# Patient Record
Sex: FEMALE | Race: BLACK OR AFRICAN AMERICAN | Employment: FULL TIME | URBAN - METROPOLITAN AREA
[De-identification: names, ages, dates, MRNs, and addresses within clinical notes are randomized per-mention and may not be internally consistent; named-entity substitution may affect disease eponyms.]

---

## 2017-02-09 ENCOUNTER — HOSPITAL ENCOUNTER (OUTPATIENT)
Dept: PHYSICAL THERAPY | Age: 52
Discharge: HOME | End: 2017-02-09
Payer: COMMERCIAL

## 2017-02-09 PROCEDURE — 97014 ELECTRIC STIMULATION THERAPY: CPT

## 2017-02-09 PROCEDURE — 97110 THERAPEUTIC EXERCISES: CPT

## 2017-02-09 PROCEDURE — 97161 PT EVAL LOW COMPLEX 20 MIN: CPT

## 2017-02-09 NOTE — PROGRESS NOTES
Ambulatory/Rehab Services H2 Model Falls Risk Assessment    Risk Factor Pts. ·   Confusion/Disorientation/Impulsivity  []    4 ·   Symptomatic Depression  []   2 ·   Altered Elimination  []   1 ·   Dizziness/Vertigo  []   1 ·   Gender (Male)  []   1 ·   Any administered antiepileptics (anticonvulsants):  []   2 ·   Any administered benzodiazepines:  []   1 ·   Visual Impairment (specify):  []   1 ·   Portable Oxygen Use  []   1 ·   Orthostatic ? BP  []   1 ·   History of Recent Falls (within 3 mos.)  []   5     Ability to Rise from Chair (choose one) Pts. ·   Ability to rise in a single movement  []   0 ·   Pushes up, successful in one attempt  [x]   1 ·   Multiple attempts, but successful  []   3 ·   Unable to rise without assistance  []   4   Total: (5 or greater = High Risk) 1     Falls Prevention Plan:   []                Physical Limitations to Exercise (specify):   []                Mobility Assistance Device (type):   []                Exercise/Equipment Adaptation (specify):    ©2010 [employer] of [address] Patent #[id number].  Federal Law prohibits the replication, distribution or use without written permission from [employer]

## 2017-02-09 NOTE — PROGRESS NOTES
Jude Katie  : 1965 [facility] [address], [place], [address] [place] [address] Street  [phone]   [phone]        OUTPATIENT PHYSICAL THERAPY:Initial Assessment 2017    ICD-10: Treatment Diagnosis: low back pain (M54.5)  Precautions/Allergies:   Review of patient's allergies indicates no known allergies. Fall Risk Score: 1 (? 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 Cruz Hernandez is a 46 y.o. female presenting to physical therapy with complaints of low back pain which began 16 after transferring a nursing home patient from the bed to a wheelchair. Patient was seen in our clinic at the end of  and is returning with referral from her pain management doctors. She reports feeling much better than she did before, but having constant low back pain. She states her pain is 5/10 with minimal to no change based on positioning or activity level. Patient does have some limitations with dressing, bathing, driving, lifting, carrying, vacuuming, sitting in the car, walking long distances, and sleeping through the night without waking due to low back pain. Patient is out of work (CNA) but would like to be able to return once her pain is controlled and she can perform the duties required of her. Patient presents with increased pain, decreased strength, decreased ROM, decreased flexibility,impaired transfer ability, decreased activity tolerance, and overall impaired functional mobility.  Patient is a good candidate for skilled physical therapy interventions to include manual therapy, therapeutic exercise, balance training, gait training, transfer training, postural re-education, body mechanics training, and pain modalities as needed. PROBLEM LIST (Impacting functional limitations):  1. Decreased Strength  2. Decreased ADL/Functional Activities  3. Decreased Transfer Abilities  4. Decreased Ambulation Ability/Technique  5. Increased Pain  6. Decreased Activity Tolerance  7. Decreased Pacing Skills  8. Decreased Work Simplification/Energy Conservation Techniques  9. Decreased Flexibility/Joint Mobility INTERVENTIONS PLANNED:  1. Balance Exercise  2. Cold  3. Electrical Stimulation  4. Gait Training  5. Heat  6. Home Exercise Program (HEP)  7. Manual Therapy  8. Neuromuscular Re-education/Strengthening  9. Range of Motion (ROM)  10. Therapeutic Activites  11. Therapeutic Exercise/Strengthening  12. Transfer Training  13. Ultrasound (US)   TREATMENT PLAN:  Effective Dates: 2/9/17 to 3/27/17. Frequency/Duration: 2 times a week for 6 weeks (12 authorized sessions)  GOALS: (Goals have been discussed and agreed upon with patient.)  Short-Term Functional Goals: Time Frame: 2/9/17 to 3/3/17  1. Patient will be independent with HEP to improve core stability, LE flexibility, and overall functional mobility. 2. Patient will report no more than 4/10 low back pain at rest in order to demonstrate improved self pain control and tolerance. 3. Patient will be educated in and demonstrate proper squat lift technique in order to improve safety with lifting activities and reduce risk of future injuries. Discharge Goals: Time Frame: 2/9/17 to 3/27/17  1. Patient will improve gross core strength to 4/5 in order to improve lumbar stability for work related tasks. 2. Patient will be able to stand or walk with minimal rest breaks for 3 hours with minimal to no increase in low back pain in order to demonstrate improved activity tolerance.   3. Patient will be able to sleep through the night without waking due to low back pain in order to return to prior sleeping pattern for overall health and wellness. 4. Patient will be able to do housework, including vacuuming, with no increased low back pain in order to demonstrate return to prior level of function. 5. Patient will be able to lift and transfer 20 lbs with good form and no complaints of low back pain in order to improve safety with return to work activities. 6. Patient will improve Modified Oswestry Scale score to 10/50 from 18/50. Rehabilitation Potential For Stated Goals: Good  Regarding Noemy Richard's therapy, I certify that the treatment plan above will be carried out by a therapist or under their direction. Thank you for this referral,  Catia Hadley PT     Referring Physician Signature: Alyssa Gregg MD              Date                    The information in this section was collected on 2/9/17 (except where otherwise noted). HISTORY:   History of Present Injury/Illness (Reason for Referral):  Ms. Pate is a 46 y.o. female presenting to physical therapy with complaints of low back pain which began 8/16/16 after transferring a nursing home patient from the bed to a wheelchair. Patient was seen in our clinic at the end of 2016 and is returning with referral from her pain management doctors. She reports feeling much better than she did before, but having constant low back pain. She states her pain is 5/10 with minimal to no change based on positioning or activity level. Patient does have some limitations with dressing, bathing, driving, lifting, carrying, vacuuming, sitting in the car, walking long distances, and sleeping through the night without waking due to low back pain. Patient is out of work (CNA) but would like to be able to return once her pain is controlled and she can perform the duties required of her.  Patient presents with increased pain, decreased strength, decreased ROM, decreased flexibility,impaired transfer ability, decreased activity tolerance, and overall impaired functional mobility. Past Medical History/Comorbidities:   Ms. Elif Bustos  has no past medical history on file. Ms. Elif Bustos  has no past surgical history on file. She reports lumbar spine MRI indicating arthritis and disc bulge. Social History/Living Environment:     Not currently working. Patient is a CNA at [employer]. Prior Level of Function/Work/Activity:  Patient is independent with ADLs, self care, transfers, and ambulation, but reports needing additional time or modifications due to low back pain. Dominant Side:         RIGHT  Personal Factors:          Sex:  female        Age:  46 y.o. Current Medications:       Current Outpatient Prescriptions:     cyclobenzaprine (FLEXERIL) 5 mg tablet, Take 2 Tabs by mouth three (3) times daily as needed for Muscle Spasm(s). , Disp: 30 Tab, Rfl: 0    lidocaine HCl (ASPERCREME, LIDOCAINE,) 4 % crea, 1 Dose by Apply Externally route three (3) times daily as needed. , Disp: 1 Tube, Rfl: 0   Date Last Reviewed:  2/9/2017   Number of Personal Factors/Comorbidities that affect the Plan of Care:  (Given chronicity of low back pain) 1-2: MODERATE COMPLEXITY   EXAMINATION:   Patient denies any LE paresthesia. Patient denies any increase of symptoms with cough, sneeze or valsalva. Patient denies any saddle paresthesia or bowel/bladder deficits. Observation/Orthostatic Postural Assessment:          In standing: minimally increased lumbar lordosis, increased skin fold noted above L ilium, L iliac crest higher than R, mild trendelenburg during stance on L LE. Palpation:          Moderate tenderness to palpation of L gluteal, piriformis, and hip musculature with moderate tightness noted. Minimal tenderness R gluteal and piriformis with moderate tightness noted. Minimal tenderness over lumbar spine and paraspinals.     ROM:  Patient reported lumbar extension more uncomfortable than flexion  AROM (degrees)   Lumbar Flexion 40   Lumbar Extension 5   Moderate tightness noted in L hamstring muscle. Moderate tightness noted in bilateral piriformis muscles. Strength:  Mildly increased low back pain reported with resisted L hip flexion  Motion Tested Left   (*/5) Right  (*/5)   Hip Flexion 4 5   Hip Abduction 4 4+   Knee Extension 5 4   Ankle Dorsiflexion 5 4   Gross core strength 3/5 as observed with transfers and activation of transverse abdominus. Special Tests:          Neural tension tests: Passive straight leg raise (SLR) test is negative. Crossed SLR test is negative. Slump test is negative. SRINIVAS: positive for pain on L        Thigh thrust: negative bilaterally        Single leg long axis distraction: negative for change in low back pain        Lumbar traction: negative for change in low back pain        Bilateral knee valgus with sit to stand transfer. Trendelenburg: positive on L     Passive Accessory Motion: None assessed today. Will continue to assess as needed. Neurological Screen:              Myotomes: Key muscle strength testing through bilateral LE is WNL. Dermatomes: Sensation to light touch for bilateral LE is intact from L2 to S2, some diminished sensation reported at L1 on L. Reflexes: Patellar (L3/ L4): 2+ bilaterally (L LE required multiple attempts)                Achilles (S1/ S2): 2+ bilaterally   Functional Mobility:         Gait/Ambulation:  Mild antalgic pattern, mild trendelenburg on L, minimal bilateral genu valgus, mildly slow tamiko. Transfers:  Minimal difficulty with sit to stand transfers with minimal to moderate use of bilateral UE. Balance:          Sitting and standing balance grossly intact. Body Structures Involved:  1. Bones  2. Joints  3. Muscles Body Functions Affected:  1. Sensory/Pain  2. Neuromusculoskeletal  3. Movement Related Activities and Participation Affected:  1. Mobility  2.  Community, Social and [place]   Number of elements (examined above) that affect the Plan of Care:  (Chronicity of pain may affect plan of care, but patient with good progress with prior therapy.) 1-2: LOW COMPLEXITY   CLINICAL PRESENTATION:   Presentation: Evolving clinical presentation with changing clinical characteristics: MODERATE COMPLEXITY   CLINICAL DECISION MAKING:   Outcome Measure: Tool Used: Modified Oswestry Low Back Pain Questionnaire  Score:  Initial: 18/50  Most Recent: X/50 (Date: -- )   Interpretation of Score: Each section is scored on a 0-5 scale, 5 representing the greatest disability. The scores of each section are added together for a total score of 50. Score 0 1-10 11-20 21-30 31-40 41-49 50   Modifier CH CI CJ CK CL CM CN     Medical Necessity:   · Patient is expected to demonstrate progress in strength, range of motion, balance, coordination and functional technique to increase independence with ambulation, transfers, and work related tasks and improve safety during lifting, carrying, tranfers, ambulation, travel, and returning to work duties. · Skilled intervention continues to be required due to increased low back pain hindering return to prior functional level including work. Reason for Services/Other Comments:  · Patient continues to require skilled intervention due to increased low back pain hindering overall activity tolerance, daily tasks, and return to prior funcitonal level including work/ job duties. Use of outcome tool(s) and clinical judgement create a POC that gives a:  (Due to chronicity of pain and reports of constant pain at this time.  Patient has not yet returned to functional tasks which will be required for work which leaves patients predicted progress questionable.) Questionable prediction of patient's progress: MODERATE COMPLEXITY            TREATMENT:   (In addition to Assessment/Re-Assessment sessions the following treatments were rendered)  Pre-treatment Symptoms/Complaints:  Patient with complaints of constant pain that does not get worse or better based on activity level. She states her pain is lower than it was before and she is doing better overall, but continues to have pain and limitations. Pain: Initial:   Pain Intensity 1: 5  Pain Location 1: Back  Pain Orientation 1: Lower, Left  Post Session:  Patient reported feeling more loose and better after session, but stated pain was 5/10. Therapeutic Exercise: (10 Minutes):  Exercises per grid below to improve mobility, strength, balance and coordination. Required minimal verbal cues to promote proper body alignment, promote proper body posture and promote proper body mechanics. Progressed resistance, range, repetitions and complexity of movement as indicated. Date:  2/9/17 Date:   Date:     Activity/Exercise Parameters Parameters Parameters   Transverse Abdominus (TA) contraction 10 x 5 seconds     Piriformis stretch Emphasis on hip external rotation, 2 x 30 seconds     SKTC 2 x 30 seconds     Clamshells With TA, 2 x 10                         Time spent with patient reviewing proper muscle recruitment and technique with exercises. Manual Therapy (     ): none today    Therapeutic Modalities: for pain/ tightness:                Lumbo-Sacral Spine Heat  Type: Moist pack  Duration : 15 minutes  Patient Position: Supine                             Lumbo-Sacral Spine Electric Stimulation  Type: Interferential  Placement: bilateral lumbar and gluteals  Duration : 15 minutes  Patient Position: Supine                                               HEP: As above; handouts given to patient for all exercises. ______________________________________________________________________________________________________    Treatment/Session Assessment:    · Response to Treatment:  Patient tolerated all stretches well and expressed/ demonstrated good understanding.  Spoke with patient regarding progressing of functional tasks in order to see how patient will response to return to work duties. Patient is motivated and anticipate good compliance with therapy sessions and overall progression. · Compliance with Program/Exercises: Will assess as treatment progresses. · Recommendations/Intent for next treatment session: \"Next visit will focus on advancements to more challenging activities\". Progress functional and work related tasks as tolerated. Manual therapy and modalities as needed for pain.     Total Treatment Duration: 55 minutes; 30 evaluation, 10 therapeutic exercise, 15 heat + electrical stimulation  PT Patient Time In/Time Out  Time In: 1005  Time Out: [address], PT

## 2017-02-13 ENCOUNTER — HOSPITAL ENCOUNTER (OUTPATIENT)
Dept: PHYSICAL THERAPY | Age: 52
Discharge: HOME | End: 2017-02-13
Payer: COMMERCIAL

## 2017-02-13 PROCEDURE — 97110 THERAPEUTIC EXERCISES: CPT

## 2017-02-13 PROCEDURE — 97014 ELECTRIC STIMULATION THERAPY: CPT

## 2017-02-13 NOTE — PROGRESS NOTES
Mello Felix  : [facility] [address], [address]  [phone]   [phone]        OUTPATIENT PHYSICAL THERAPY:Daily Note 2017    ICD-10: Treatment Diagnosis: low back pain (M54.5)  Precautions/Allergies:   Review of patient's allergies indicates no known allergies. Fall Risk Score: 1 (? 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 Pate is a 46 y.o. female presenting to physical therapy with complaints of low back pain which began 16 after transferring a nursing home patient from the bed to a wheelchair. Patient was seen in our clinic at the end of  and is returning with referral from her pain management doctors. She reports feeling much better than she did before, but having constant low back pain. She states her pain is 5/10 with minimal to no change based on positioning or activity level. Patient does have some limitations with dressing, bathing, driving, lifting, carrying, vacuuming, sitting in the car, walking long distances, and sleeping through the night without waking due to low back pain. Patient is out of work (CNA) but would like to be able to return once her pain is controlled and she can perform the duties required of her. Patient presents with increased pain, decreased strength, decreased ROM, decreased flexibility,impaired transfer ability, decreased activity tolerance, and overall impaired functional mobility.  Patient is a good candidate for skilled physical therapy interventions to include manual therapy, therapeutic exercise, balance training, gait training, transfer training, postural re-education, body mechanics training, and pain modalities as needed. PROBLEM LIST (Impacting functional limitations):  1. Decreased Strength  2. Decreased ADL/Functional Activities  3. Decreased Transfer Abilities  4. Decreased Ambulation Ability/Technique  5. Increased Pain  6. Decreased Activity Tolerance  7. Decreased Pacing Skills  8. Decreased Work Simplification/Energy Conservation Techniques  9. Decreased Flexibility/Joint Mobility INTERVENTIONS PLANNED:  1. Balance Exercise  2. Cold  3. Electrical Stimulation  4. Gait Training  5. Heat  6. Home Exercise Program (HEP)  7. Manual Therapy  8. Neuromuscular Re-education/Strengthening  9. Range of Motion (ROM)  10. Therapeutic Activites  11. Therapeutic Exercise/Strengthening  12. Transfer Training  13. Ultrasound (US)   TREATMENT PLAN:  Effective Dates: 2/9/17 to 3/27/17. Frequency/Duration: 2 times a week for 6 weeks (12 authorized sessions)  GOALS: (Goals have been discussed and agreed upon with patient.)  Short-Term Functional Goals: Time Frame: 2/9/17 to 3/3/17  1. Patient will be independent with HEP to improve core stability, LE flexibility, and overall functional mobility. 2. Patient will report no more than 4/10 low back pain at rest in order to demonstrate improved self pain control and tolerance. 3. Patient will be educated in and demonstrate proper squat lift technique in order to improve safety with lifting activities and reduce risk of future injuries. Discharge Goals: Time Frame: 2/9/17 to 3/27/17  1. Patient will improve gross core strength to 4/5 in order to improve lumbar stability for work related tasks. 2. Patient will be able to stand or walk with minimal rest breaks for 3 hours with minimal to no increase in low back pain in order to demonstrate improved activity tolerance.   3. Patient will be able to sleep through the night without waking due to low back pain in order to return to prior sleeping pattern for overall health and wellness. 4. Patient will be able to do housework, including vacuuming, with no increased low back pain in order to demonstrate return to prior level of function. 5. Patient will be able to lift and transfer 20 lbs with good form and no complaints of low back pain in order to improve safety with return to work activities. 6. Patient will improve Modified Oswestry Scale score to 10/50 from 18/50. Rehabilitation Potential For Stated Goals: Good  Regarding Maykel Richard's therapy, I certify that the treatment plan above will be carried out by a therapist or under their direction. Thank you for this referral,  Shara Thurman PT     Referring Physician Signature: Jeny Cotton MD              Date                    The information in this section was collected on 2/9/17 (except where otherwise noted). HISTORY:   History of Present Injury/Illness (Reason for Referral):  Ms. Pate is a 46 y.o. female presenting to physical therapy with complaints of low back pain which began 8/16/16 after transferring a nursing home patient from the bed to a wheelchair. Patient was seen in our clinic at the end of 2016 and is returning with referral from her pain management doctors. She reports feeling much better than she did before, but having constant low back pain. She states her pain is 5/10 with minimal to no change based on positioning or activity level. Patient does have some limitations with dressing, bathing, driving, lifting, carrying, vacuuming, sitting in the car, walking long distances, and sleeping through the night without waking due to low back pain. Patient is out of work (CNA) but would like to be able to return once her pain is controlled and she can perform the duties required of her.  Patient presents with increased pain, decreased strength, decreased ROM, decreased flexibility,impaired transfer ability, decreased activity tolerance, and overall impaired functional mobility. Past Medical History/Comorbidities:   Ms. Pate  has no past medical history on file. Ms. Pate  has no past surgical history on file. She reports lumbar spine MRI indicating arthritis and disc bulge. Social History/Living Environment:     Not currently working. Patient is a CNA at [employer]. Prior Level of Function/Work/Activity:  Patient is independent with ADLs, self care, transfers, and ambulation, but reports needing additional time or modifications due to low back pain. Dominant Side:         RIGHT  Personal Factors:          Sex:  female        Age:  46 y.o. Current Medications:       Current Outpatient Prescriptions:     cyclobenzaprine (FLEXERIL) 5 mg tablet, Take 2 Tabs by mouth three (3) times daily as needed for Muscle Spasm(s). , Disp: 30 Tab, Rfl: 0    lidocaine HCl (ASPERCREME, LIDOCAINE,) 4 % crea, 1 Dose by Apply Externally route three (3) times daily as needed. , Disp: 1 Tube, Rfl: 0   Date Last Reviewed:  2/13/2017   Number of Personal Factors/Comorbidities that affect the Plan of Care:  (Given chronicity of low back pain) 1-2: MODERATE COMPLEXITY   EXAMINATION:   Patient denies any LE paresthesia. Patient denies any increase of symptoms with cough, sneeze or valsalva. Patient denies any saddle paresthesia or bowel/bladder deficits. Observation/Orthostatic Postural Assessment:          In standing: minimally increased lumbar lordosis, increased skin fold noted above L ilium, L iliac crest higher than R, mild trendelenburg during stance on L LE. Palpation:          Moderate tenderness to palpation of L gluteal, piriformis, and hip musculature with moderate tightness noted. Minimal tenderness R gluteal and piriformis with moderate tightness noted. Minimal tenderness over lumbar spine and paraspinals.     ROM:  Patient reported lumbar extension more uncomfortable than flexion  AROM (degrees)   Lumbar Flexion 40   Lumbar Extension 5   Moderate tightness noted in L hamstring muscle. Moderate tightness noted in bilateral piriformis muscles. Strength:  Mildly increased low back pain reported with resisted L hip flexion  Motion Tested Left   (*/5) Right  (*/5)   Hip Flexion 4 5   Hip Abduction 4 4+   Knee Extension 5 4   Ankle Dorsiflexion 5 4   Gross core strength 3/5 as observed with transfers and activation of transverse abdominus. Special Tests:          Neural tension tests: Passive straight leg raise (SLR) test is negative. Crossed SLR test is negative. Slump test is negative. SRINIVAS: positive for pain on L        Thigh thrust: negative bilaterally        Single leg long axis distraction: negative for change in low back pain        Lumbar traction: negative for change in low back pain        Bilateral knee valgus with sit to stand transfer. Trendelenburg: positive on L     Passive Accessory Motion: None assessed today. Will continue to assess as needed. Neurological Screen:              Myotomes: Key muscle strength testing through bilateral LE is WNL. Dermatomes: Sensation to light touch for bilateral LE is intact from L2 to S2, some diminished sensation reported at L1 on L. Reflexes: Patellar (L3/ L4): 2+ bilaterally (L LE required multiple attempts)                Achilles (S1/ S2): 2+ bilaterally   Functional Mobility:         Gait/Ambulation:  Mild antalgic pattern, mild trendelenburg on L, minimal bilateral genu valgus, mildly slow tamiko. Transfers:  Minimal difficulty with sit to stand transfers with minimal to moderate use of bilateral UE. Balance:          Sitting and standing balance grossly intact. Body Structures Involved:  1. Bones  2. Joints  3. Muscles Body Functions Affected:  1. Sensory/Pain  2. Neuromusculoskeletal  3. Movement Related Activities and Participation Affected:  1. Mobility  2.  Community, Social and Independence [place]   Number of elements (examined above) that affect the Plan of Care:  (Chronicity of pain may affect plan of care, but patient with good progress with prior therapy.) 1-2: LOW COMPLEXITY   CLINICAL PRESENTATION:   Presentation: Evolving clinical presentation with changing clinical characteristics: MODERATE COMPLEXITY   CLINICAL DECISION MAKING:   Outcome Measure: Tool Used: Modified Oswestry Low Back Pain Questionnaire  Score:  Initial: 18/50  Most Recent: X/50 (Date: -- )   Interpretation of Score: Each section is scored on a 0-5 scale, 5 representing the greatest disability. The scores of each section are added together for a total score of 50. Score 0 1-10 11-20 21-30 31-40 41-49 50   Modifier CH CI CJ CK CL CM CN     Medical Necessity:   · Patient is expected to demonstrate progress in strength, range of motion, balance, coordination and functional technique to increase independence with ambulation, transfers, and work related tasks and improve safety during lifting, carrying, tranfers, ambulation, travel, and returning to work duties. · Skilled intervention continues to be required due to increased low back pain hindering return to prior functional level including work. Reason for Services/Other Comments:  · Patient continues to require skilled intervention due to increased low back pain hindering overall activity tolerance, daily tasks, and return to prior funcitonal level including work/ job duties. Use of outcome tool(s) and clinical judgement create a POC that gives a:  (Due to chronicity of pain and reports of constant pain at this time.  Patient has not yet returned to functional tasks which will be required for work which leaves patients predicted progress questionable.) Questionable prediction of patient's progress: MODERATE COMPLEXITY            TREATMENT:   (In addition to Assessment/Re-Assessment sessions the following treatments were rendered)  Pre-treatment Symptoms/Complaints: Patient with some increased pain this morning, but reports feeling more tight in the mornings in general.  Pain: Initial:   Pain Intensity 1: 6  Pain Location 1: Back  Pain Orientation 1: Lower  Post Session: Patient with some decreased pain and tightness to 5/10 at end of session. Therapeutic Exercise: (40 Minutes):  Exercises per grid below to improve mobility, strength, balance and coordination. Required minimal verbal cues to promote proper body alignment, promote proper body posture and promote proper body mechanics. Progressed resistance, range, repetitions and complexity of movement as indicated. Date:  2/9/17 Date:  2/13/17 Date:     Activity/Exercise Parameters Parameters Parameters   Transverse Abdominus (TA) contraction 10 x 5 seconds 10 x 5 seconds    Piriformis stretch Emphasis on hip external rotation, 2 x 30 seconds ---    SKTC 2 x 30 seconds ---    Clamshells With TA, 2 x 10 ---    NU step --- Level 3, x 6 minutes    Mini squats --- With TA, 2 x 10    Sit to stand --- With TA, 2 x 5    Calf raise --- 2 x 10    Calf stretch --- Slant board, 3 x 30 seconds    Hip abduction --- Seated, with TA, green, 2 x 10  Standing, red band at ankles, x 10 each    Hip adduction --- Seated, with TA, x 10            Time spent with patient reviewing proper muscle recruitment and technique with exercises. Manual Therapy (     ): none today; even leg length with level pelvic landmarks    Therapeutic Modalities: for pain/ tightness:                Lumbo-Sacral Spine Heat  Type: Moist pack  Duration : 15 minutes  Patient Position: Supine                             Lumbo-Sacral Spine Electric Stimulation  Type: Interferential  Placement: bilateral lumbar and gluteals  Duration : 15 minutes  Patient Position: Supine                                               HEP: As above; handouts given to patient for all exercises.   ______________________________________________________________________________________________________    Treatment/Session Assessment:    · Response to Treatment: Patient tolerated all exercises well with some decreased pain and tightness at end of treatment. Patient compliant and motivated with therapy sessions. · Compliance with Program/Exercises: Will assess as treatment progresses. · Recommendations/Intent for next treatment session: \"Next visit will focus on advancements to more challenging activities\". Progress functional and work related tasks as tolerated. Manual therapy and modalities as needed for pain.     Total Treatment Duration: 55 minutes  PT Patient Time In/Time Out  Time In: 0800  Time Out: [id number]    Juanita Hu PT

## 2017-02-15 ENCOUNTER — HOSPITAL ENCOUNTER (OUTPATIENT)
Dept: PHYSICAL THERAPY | Age: 52
Discharge: HOME | End: 2017-02-15
Payer: COMMERCIAL

## 2017-02-15 PROCEDURE — 97014 ELECTRIC STIMULATION THERAPY: CPT

## 2017-02-15 PROCEDURE — 97110 THERAPEUTIC EXERCISES: CPT

## 2017-02-15 NOTE — PROGRESS NOTES
Sergio Dunhamrosalina  : [facility] [address], [place], [address] [place] [address]  [phone]   [phone]        OUTPATIENT PHYSICAL THERAPY:Daily Note 2/15/2017    ICD-10: Treatment Diagnosis: low back pain (M54.5)  Precautions/Allergies:   Review of patient's allergies indicates no known allergies. Fall Risk Score: 1 (? 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 Mona Gifford is a 46 y.o. female presenting to physical therapy with complaints of low back pain which began 16 after transferring a nursing home patient from the bed to a wheelchair. Patient was seen in our clinic at the end of  and is returning with referral from her pain management doctors. She reports feeling much better than she did before, but having constant low back pain. She states her pain is 5/10 with minimal to no change based on positioning or activity level. Patient does have some limitations with dressing, bathing, driving, lifting, carrying, vacuuming, sitting in the car, walking long distances, and sleeping through the night without waking due to low back pain. Patient is out of work (CNA) but would like to be able to return once her pain is controlled and she can perform the duties required of her. Patient presents with increased pain, decreased strength, decreased ROM, decreased flexibility,impaired transfer ability, decreased activity tolerance, and overall impaired functional mobility.  Patient is a good candidate for skilled physical therapy interventions to include manual therapy, therapeutic exercise, balance training, gait training, transfer training, postural re-education, body mechanics training, and pain modalities as needed. PROBLEM LIST (Impacting functional limitations):  1. Decreased Strength  2. Decreased ADL/Functional Activities  3. Decreased Transfer Abilities  4. Decreased Ambulation Ability/Technique  5. Increased Pain  6. Decreased Activity Tolerance  7. Decreased Pacing Skills  8. Decreased Work Simplification/Energy Conservation Techniques  9. Decreased Flexibility/Joint Mobility INTERVENTIONS PLANNED:  1. Balance Exercise  2. Cold  3. Electrical Stimulation  4. Gait Training  5. Heat  6. Home Exercise Program (HEP)  7. Manual Therapy  8. Neuromuscular Re-education/Strengthening  9. Range of Motion (ROM)  10. Therapeutic Activites  11. Therapeutic Exercise/Strengthening  12. Transfer Training  13. Ultrasound (US)   TREATMENT PLAN:  Effective Dates: 2/9/17 to 3/27/17. Frequency/Duration: 2 times a week for 6 weeks (12 authorized sessions)  GOALS: (Goals have been discussed and agreed upon with patient.)  Short-Term Functional Goals: Time Frame: 2/9/17 to 3/3/17  1. Patient will be independent with HEP to improve core stability, LE flexibility, and overall functional mobility. 2. Patient will report no more than 4/10 low back pain at rest in order to demonstrate improved self pain control and tolerance. 3. Patient will be educated in and demonstrate proper squat lift technique in order to improve safety with lifting activities and reduce risk of future injuries. Discharge Goals: Time Frame: 2/9/17 to 3/27/17  1. Patient will improve gross core strength to 4/5 in order to improve lumbar stability for work related tasks. 2. Patient will be able to stand or walk with minimal rest breaks for 3 hours with minimal to no increase in low back pain in order to demonstrate improved activity tolerance.   3. Patient will be able to sleep through the night without waking due to low back pain in order to return to prior sleeping pattern for overall health and wellness. 4. Patient will be able to do housework, including vacuuming, with no increased low back pain in order to demonstrate return to prior level of function. 5. Patient will be able to lift and transfer 20 lbs with good form and no complaints of low back pain in order to improve safety with return to work activities. 6. Patient will improve Modified Oswestry Scale score to 10/50 from 18/50. Rehabilitation Potential For Stated Goals: Good  Regarding Sukh Richard's therapy, I certify that the treatment plan above will be carried out by a therapist or under their direction. Thank you for this referral,  Annella Kocher, PTA     Referring Physician Signature: Edd Alcantar MD              Date                    The information in this section was collected on 2/9/17 (except where otherwise noted). HISTORY:   History of Present Injury/Illness (Reason for Referral):  Ms. Pate is a 46 y.o. female presenting to physical therapy with complaints of low back pain which began 8/16/16 after transferring a nursing home patient from the bed to a wheelchair. Patient was seen in our clinic at the end of 2016 and is returning with referral from her pain management doctors. She reports feeling much better than she did before, but having constant low back pain. She states her pain is 5/10 with minimal to no change based on positioning or activity level. Patient does have some limitations with dressing, bathing, driving, lifting, carrying, vacuuming, sitting in the car, walking long distances, and sleeping through the night without waking due to low back pain. Patient is out of work (CNA) but would like to be able to return once her pain is controlled and she can perform the duties required of her.  Patient presents with increased pain, decreased strength, decreased ROM, decreased flexibility,impaired transfer ability, decreased activity tolerance, and overall impaired functional mobility. Past Medical History/Comorbidities:   Ms. Pate  has no past medical history on file. Ms. Pate  has no past surgical history on file. She reports lumbar spine MRI indicating arthritis and disc bulge. Social History/Living Environment:     Not currently working. Patient is a CNA at [employer]. Prior Level of Function/Work/Activity:  Patient is independent with ADLs, self care, transfers, and ambulation, but reports needing additional time or modifications due to low back pain. Dominant Side:         RIGHT  Personal Factors:          Sex:  female        Age:  46 y.o. Current Medications:       Current Outpatient Prescriptions:     cyclobenzaprine (FLEXERIL) 5 mg tablet, Take 2 Tabs by mouth three (3) times daily as needed for Muscle Spasm(s). , Disp: 30 Tab, Rfl: 0    lidocaine HCl (ASPERCREME, LIDOCAINE,) 4 % crea, 1 Dose by Apply Externally route three (3) times daily as needed. , Disp: 1 Tube, Rfl: 0   Date Last Reviewed:  2/15/2017   Number of Personal Factors/Comorbidities that affect the Plan of Care:  (Given chronicity of low back pain) 1-2: MODERATE COMPLEXITY   EXAMINATION:   Patient denies any LE paresthesia. Patient denies any increase of symptoms with cough, sneeze or valsalva. Patient denies any saddle paresthesia or bowel/bladder deficits. Observation/Orthostatic Postural Assessment:          In standing: minimally increased lumbar lordosis, increased skin fold noted above L ilium, L iliac crest higher than R, mild trendelenburg during stance on L LE. Palpation:          Moderate tenderness to palpation of L gluteal, piriformis, and hip musculature with moderate tightness noted. Minimal tenderness R gluteal and piriformis with moderate tightness noted. Minimal tenderness over lumbar spine and paraspinals.     ROM:  Patient reported lumbar extension more uncomfortable than flexion  AROM (degrees)   Lumbar Flexion 40   Lumbar Extension 5   Moderate tightness noted in L hamstring muscle. Moderate tightness noted in bilateral piriformis muscles. Strength:  Mildly increased low back pain reported with resisted L hip flexion  Motion Tested Left   (*/5) Right  (*/5)   Hip Flexion 4 5   Hip Abduction 4 4+   Knee Extension 5 4   Ankle Dorsiflexion 5 4   Gross core strength 3/5 as observed with transfers and activation of transverse abdominus. Special Tests:          Neural tension tests: Passive straight leg raise (SLR) test is negative. Crossed SLR test is negative. Slump test is negative. SRINIVAS: positive for pain on L        Thigh thrust: negative bilaterally        Single leg long axis distraction: negative for change in low back pain        Lumbar traction: negative for change in low back pain        Bilateral knee valgus with sit to stand transfer. Trendelenburg: positive on L     Passive Accessory Motion: None assessed today. Will continue to assess as needed. Neurological Screen:              Myotomes: Key muscle strength testing through bilateral LE is WNL. Dermatomes: Sensation to light touch for bilateral LE is intact from L2 to S2, some diminished sensation reported at L1 on L. Reflexes: Patellar (L3/ L4): 2+ bilaterally (L LE required multiple attempts)                Achilles (S1/ S2): 2+ bilaterally   Functional Mobility:         Gait/Ambulation:  Mild antalgic pattern, mild trendelenburg on L, minimal bilateral genu valgus, mildly slow tamiko. Transfers:  Minimal difficulty with sit to stand transfers with minimal to moderate use of bilateral UE. Balance:          Sitting and standing balance grossly intact. Body Structures Involved:  1. Bones  2. Joints  3. Muscles Body Functions Affected:  1. Sensory/Pain  2. Neuromusculoskeletal  3. Movement Related Activities and Participation Affected:  1. Mobility  2.  Community, Social and [place]   Number of elements (examined above) that affect the Plan of Care:  (Chronicity of pain may affect plan of care, but patient with good progress with prior therapy.) 1-2: LOW COMPLEXITY   CLINICAL PRESENTATION:   Presentation: Evolving clinical presentation with changing clinical characteristics: MODERATE COMPLEXITY   CLINICAL DECISION MAKING:   Outcome Measure: Tool Used: Modified Oswestry Low Back Pain Questionnaire  Score:  Initial: 18/50  Most Recent: X/50 (Date: -- )   Interpretation of Score: Each section is scored on a 0-5 scale, 5 representing the greatest disability. The scores of each section are added together for a total score of 50. Score 0 1-10 11-20 21-30 31-40 41-49 50   Modifier CH CI CJ CK CL CM CN     Medical Necessity:   · Patient is expected to demonstrate progress in strength, range of motion, balance, coordination and functional technique to increase independence with ambulation, transfers, and work related tasks and improve safety during lifting, carrying, tranfers, ambulation, travel, and returning to work duties. · Skilled intervention continues to be required due to increased low back pain hindering return to prior functional level including work. Reason for Services/Other Comments:  · Patient continues to require skilled intervention due to increased low back pain hindering overall activity tolerance, daily tasks, and return to prior funcitonal level including work/ job duties. Use of outcome tool(s) and clinical judgement create a POC that gives a:  (Due to chronicity of pain and reports of constant pain at this time.  Patient has not yet returned to functional tasks which will be required for work which leaves patients predicted progress questionable.) Questionable prediction of patient's progress: MODERATE COMPLEXITY            TREATMENT:   (In addition to Assessment/Re-Assessment sessions the following treatments were rendered)  Pre-treatment Symptoms/Complaints: Patient C/O  Increased back pain this morning, states she has improved over all however low back pain is constant . Pain: Initial:   Pain Intensity 1: 6  Pain Location 1: Back  Pain Orientation 1: Lower, Mid, Left  Post Session: Patient with some decreased pain and tightness to 5/10 at end of session. Therapeutic Exercise: (40 Minutes):  Exercises per grid below to improve mobility, strength, balance and coordination. Required minimal verbal cues to promote proper body alignment, promote proper body posture and promote proper body mechanics. Progressed resistance, range, repetitions and complexity of movement as indicated. Date:  2/9/17 Date:  2/13/17 Date:  2-15-17   Activity/Exercise Parameters Parameters Parameters   Transverse Abdominus (TA) contraction 10 x 5 seconds 10 x 5 seconds 10 x 10 sec   Piriformis stretch Emphasis on hip external rotation, 2 x 30 seconds ---    SKTC 2 x 30 seconds ---    Clamshells With TA, 2 x 10 --- Green 3 x 10   NU step --- Level 3, x 6 minutes 10 minutes level 3   Mini squats --- With TA, 2 x 10 With TA 2 x 10   Sit to stand --- With TA, 2 x 5 With TA 2 x 10   Calf raise --- 2 x 10 2 x 15   Calf stretch --- Slant board, 3 x 30 seconds Slant board 4 x 30 sec   Hip abduction --- Seated, with TA, green, 2 x 10  Standing, red band at ankles, x 10 each Black band 3 x 10   Hip adduction --- Seated, with TA, x 10 2 x 10                             Time spent with patient reviewing proper muscle recruitment and technique with exercises. Manual Therapy (     ): none today; even leg length with level pelvic landmarks    Therapeutic Modalities: for pain/ tightness:                Lumbo-Sacral Spine Heat  Type: Moist pack  Duration : 15 minutes  Patient Position: Supine                             Lumbo-Sacral Spine Electric Stimulation  Type:  Interferential  Placement: lumbosacral region  Duration : 15 minutes  Patient Position: Supine                                               HEP: As above; handouts given to patient for all exercises. ______________________________________________________________________________________________________    Treatment/Session Assessment:    · Response to Treatment:  decreased pain and tightness at end of treatment. Patient compliant and motivated with therapy sessions. · Compliance with Program/Exercises: Will assess as treatment progresses. · Recommendations/Intent for next treatment session: \"Next visit will focus on advancements to more challenging activities\". Progress functional and work related tasks as tolerated. Manual therapy and modalities as needed for pain.     Total Treatment Duration: 55 minutes  PT Patient Time In/Time Out  Time In: 0800  Time Out: 0900    Madeleine Michel, PTA

## 2017-02-21 ENCOUNTER — HOSPITAL ENCOUNTER (OUTPATIENT)
Dept: PHYSICAL THERAPY | Age: 52
Discharge: HOME | End: 2017-02-21
Payer: COMMERCIAL

## 2017-02-21 PROCEDURE — 97110 THERAPEUTIC EXERCISES: CPT

## 2017-02-21 PROCEDURE — 97014 ELECTRIC STIMULATION THERAPY: CPT

## 2017-02-21 NOTE — PROGRESS NOTES
Mila Smith  : [facility] [address], [address]  [phone]   [phone]        OUTPATIENT PHYSICAL THERAPY:Daily Note 2017    ICD-10: Treatment Diagnosis: low back pain (M54.5)  Precautions/Allergies:   Review of patient's allergies indicates no known allergies. Fall Risk Score: 1 (? 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: [address]     INITIAL ASSESSMENT:  Ms. Pate is a 46 y.o. female presenting to physical therapy with complaints of low back pain which began 16 after transferring a nursing home patient from the bed to a wheelchair. Patient was seen in our clinic at the end of  and is returning with referral from her pain management doctors. She reports feeling much better than she did before, but having constant low back pain. She states her pain is 5/10 with minimal to no change based on positioning or activity level. Patient does have some limitations with dressing, bathing, driving, lifting, carrying, vacuuming, sitting in the car, walking long distances, and sleeping through the night without waking due to low back pain. Patient is out of work (CNA) but would like to be able to return once her pain is controlled and she can perform the duties required of her. Patient presents with increased pain, decreased strength, decreased ROM, decreased flexibility,impaired transfer ability, decreased activity tolerance, and overall impaired functional mobility.  Patient is a good candidate for skilled physical therapy interventions to include manual therapy, therapeutic exercise, balance training, gait training, transfer training, postural re-education, body mechanics training, and pain modalities as needed. PROBLEM LIST (Impacting functional limitations):  1. Decreased Strength  2. Decreased ADL/Functional Activities  3. Decreased Transfer Abilities  4. Decreased Ambulation Ability/Technique  5. Increased Pain  6. Decreased Activity Tolerance  7. Decreased Pacing Skills  8. Decreased Work Simplification/Energy Conservation Techniques  9. Decreased Flexibility/Joint Mobility INTERVENTIONS PLANNED:  1. Balance Exercise  2. Cold  3. Electrical Stimulation  4. Gait Training  5. Heat  6. Home Exercise Program (HEP)  7. Manual Therapy  8. Neuromuscular Re-education/Strengthening  9. Range of Motion (ROM)  10. Therapeutic Activites  11. Therapeutic Exercise/Strengthening  12. Transfer Training  13. Ultrasound (US)   TREATMENT PLAN:  Effective Dates: 2/9/17 to 3/27/17. Frequency/Duration: 2 times a week for 6 weeks (12 authorized sessions)  GOALS: (Goals have been discussed and agreed upon with patient.)  Short-Term Functional Goals: Time Frame: 2/9/17 to 3/3/17  1. Patient will be independent with HEP to improve core stability, LE flexibility, and overall functional mobility. 2. Patient will report no more than 4/10 low back pain at rest in order to demonstrate improved self pain control and tolerance. 3. Patient will be educated in and demonstrate proper squat lift technique in order to improve safety with lifting activities and reduce risk of future injuries. Discharge Goals: Time Frame: 2/9/17 to 3/27/17  1. Patient will improve gross core strength to 4/5 in order to improve lumbar stability for work related tasks. 2. Patient will be able to stand or walk with minimal rest breaks for 3 hours with minimal to no increase in low back pain in order to demonstrate improved activity tolerance.   3. Patient will be able to sleep through the night without waking due to low back pain in order to return to prior sleeping pattern for overall health and wellness. 4. Patient will be able to do housework, including vacuuming, with no increased low back pain in order to demonstrate return to prior level of function. 5. Patient will be able to lift and transfer 20 lbs with good form and no complaints of low back pain in order to improve safety with return to work activities. 6. Patient will improve Modified Oswestry Scale score to 10/50 from 18/50. Rehabilitation Potential For Stated Goals: Good  Regarding Shayla Richard's therapy, I certify that the treatment plan above will be carried out by a therapist or under their direction. Thank you for this referral,  Brigitte Ordaz PT     Referring Physician Signature: Jaclyn Marley MD              Date                    The information in this section was collected on 2/9/17 (except where otherwise noted). HISTORY:   History of Present Injury/Illness (Reason for Referral):  Ms. Pate is a 46 y.o. female presenting to physical therapy with complaints of low back pain which began 8/16/16 after transferring a nursing home patient from the bed to a wheelchair. Patient was seen in our clinic at the end of 2016 and is returning with referral from her pain management doctors. She reports feeling much better than she did before, but having constant low back pain. She states her pain is 5/10 with minimal to no change based on positioning or activity level. Patient does have some limitations with dressing, bathing, driving, lifting, carrying, vacuuming, sitting in the car, walking long distances, and sleeping through the night without waking due to low back pain. Patient is out of work (CNA) but would like to be able to return once her pain is controlled and she can perform the duties required of her.  Patient presents with increased pain, decreased strength, decreased ROM, decreased flexibility,impaired transfer ability, decreased activity tolerance, and overall impaired functional mobility. Past Medical History/Comorbidities:   Ms. Candy Rico  has no past medical history on file. Ms. Candy Rico  has no past surgical history on file. She reports lumbar spine MRI indicating arthritis and disc bulge. Social History/Living Environment:     Not currently working. Patient is a CNA at [employer]. Prior Level of Function/Work/Activity:  Patient is independent with ADLs, self care, transfers, and ambulation, but reports needing additional time or modifications due to low back pain. Dominant Side:         RIGHT  Personal Factors:          Sex:  female        Age:  46 y.o. Current Medications:       Current Outpatient Prescriptions:     cyclobenzaprine (FLEXERIL) 5 mg tablet, Take 2 Tabs by mouth three (3) times daily as needed for Muscle Spasm(s). , Disp: 30 Tab, Rfl: 0    lidocaine HCl (ASPERCREME, LIDOCAINE,) 4 % crea, 1 Dose by Apply Externally route three (3) times daily as needed. , Disp: 1 Tube, Rfl: 0   Date Last Reviewed:  2/21/2017   Number of Personal Factors/Comorbidities that affect the Plan of Care:  (Given chronicity of low back pain) 1-2: MODERATE COMPLEXITY   EXAMINATION:   Patient denies any LE paresthesia. Patient denies any increase of symptoms with cough, sneeze or valsalva. Patient denies any saddle paresthesia or bowel/bladder deficits. Observation/Orthostatic Postural Assessment:          In standing: minimally increased lumbar lordosis, increased skin fold noted above L ilium, L iliac crest higher than R, mild trendelenburg during stance on L LE. Palpation:          Moderate tenderness to palpation of L gluteal, piriformis, and hip musculature with moderate tightness noted. Minimal tenderness R gluteal and piriformis with moderate tightness noted. Minimal tenderness over lumbar spine and paraspinals.     ROM:  Patient reported lumbar extension more uncomfortable than flexion  AROM (degrees)   Lumbar Flexion 40   Lumbar Extension 5   Moderate tightness noted in L hamstring muscle. Moderate tightness noted in bilateral piriformis muscles. Strength:  Mildly increased low back pain reported with resisted L hip flexion  Motion Tested Left   (*/5) Right  (*/5)   Hip Flexion 4 5   Hip Abduction 4 4+   Knee Extension 5 4   Ankle Dorsiflexion 5 4   Gross core strength 3/5 as observed with transfers and activation of transverse abdominus. Special Tests:          Neural tension tests: Passive straight leg raise (SLR) test is negative. Crossed SLR test is negative. Slump test is negative. SRINIVAS: positive for pain on L        Thigh thrust: negative bilaterally        Single leg long axis distraction: negative for change in low back pain        Lumbar traction: negative for change in low back pain        Bilateral knee valgus with sit to stand transfer. Trendelenburg: positive on L     Passive Accessory Motion: None assessed today. Will continue to assess as needed. Neurological Screen:              Myotomes: Key muscle strength testing through bilateral LE is WNL. Dermatomes: Sensation to light touch for bilateral LE is intact from L2 to S2, some diminished sensation reported at L1 on L. Reflexes: Patellar (L3/ L4): 2+ bilaterally (L LE required multiple attempts)                Achilles (S1/ S2): 2+ bilaterally   Functional Mobility:         Gait/Ambulation:  Mild antalgic pattern, mild trendelenburg on L, minimal bilateral genu valgus, mildly slow tamiko. Transfers:  Minimal difficulty with sit to stand transfers with minimal to moderate use of bilateral UE. Balance:          Sitting and standing balance grossly intact. Body Structures Involved:  1. Bones  2. Joints  3. Muscles Body Functions Affected:  1. Sensory/Pain  2. Neuromusculoskeletal  3. Movement Related Activities and Participation Affected:  1. Mobility  2.  Community, Social and [place]   Number of elements (examined above) that affect the Plan of Care:  (Chronicity of pain may affect plan of care, but patient with good progress with prior therapy.) 1-2: LOW COMPLEXITY   CLINICAL PRESENTATION:   Presentation: Evolving clinical presentation with changing clinical characteristics: MODERATE COMPLEXITY   CLINICAL DECISION MAKING:   Outcome Measure: Tool Used: Modified Oswestry Low Back Pain Questionnaire  Score:  Initial: 18/50  Most Recent: X/50 (Date: -- )   Interpretation of Score: Each section is scored on a 0-5 scale, 5 representing the greatest disability. The scores of each section are added together for a total score of 50. Score 0 1-10 11-20 21-30 31-40 41-49 50   Modifier CH CI CJ CK CL CM CN     Medical Necessity:   · Patient is expected to demonstrate progress in strength, range of motion, balance, coordination and functional technique to increase independence with ambulation, transfers, and work related tasks and improve safety during lifting, carrying, tranfers, ambulation, travel, and returning to work duties. · Skilled intervention continues to be required due to increased low back pain hindering return to prior functional level including work. Reason for Services/Other Comments:  · Patient continues to require skilled intervention due to increased low back pain hindering overall activity tolerance, daily tasks, and return to prior funcitonal level including work/ job duties. Use of outcome tool(s) and clinical judgement create a POC that gives a:  (Due to chronicity of pain and reports of constant pain at this time.  Patient has not yet returned to functional tasks which will be required for work which leaves patients predicted progress questionable.) Questionable prediction of patient's progress: MODERATE COMPLEXITY            TREATMENT:   (In addition to Assessment/Re-Assessment sessions the following treatments were rendered)  Pre-treatment Symptoms/Complaints: Patient reports feeling good overall with no real back pain, reports a constant 5/10 as her \"normal\". Pain: Initial:   Pain Intensity 1: 5  Pain Location 1: Back  Pain Orientation 1: Lower, Mid  Post Session: Patient with some reports of muscle soreness and feeling like she worked hard today, but reports 5/10 low back pain. Therapeutic Exercise: (40 Minutes):  Exercises per grid below to improve mobility, strength, balance and coordination. Required minimal verbal cues to promote proper body alignment, promote proper body posture and promote proper body mechanics. Progressed resistance, range, repetitions and complexity of movement as indicated. Date:  2/21/17 Date:  2/13/17 Date:  2-15-17   Activity/Exercise Parameters Parameters Parameters   Transverse Abdominus (TA) contraction With all exercises 10 x 5 seconds 10 x 10 sec   Piriformis stretch Emphasis on hip external rotation, 3 x 30 seconds ---    SKTC 3 x 30 seconds ---    Clamshells --- --- [place] Handing 3 x 10   NU step Level 3, x 10 minutes Level 3, x 6 minutes 10 minutes level 3   Mini squats --- With TA, 2 x 10 With TA 2 x 10   Sit to stand With TA, 2 x 10 With TA, 2 x 5 With TA 2 x 10   Calf raise --- 2 x 10 2 x 15   Calf stretch --- Slant board, 3 x 30 seconds Slant board 4 x 30 sec   Hip abduction --- Seated, with TA, green, 2 x 10  Standing, red band at ankles, x 10 each Black band 3 x 10   Hip adduction --- Seated, with TA, x 10 2 x 10   Lateral walks Red band at ankles, x 10 steps each side     Single leg stance With TA and green band rows, x 10 each     Lifting 4 inch step, 10 lbs, table to chair, x 5              Time spent with patient reviewing proper muscle recruitment and technique with exercises.     Manual Therapy (     ): none today    Therapeutic Modalities: for pain/ tightness:                Lumbo-Sacral Spine Heat  Type: Moist pack  Duration : 15 minutes  Patient Position: Supine                             Lumbo-Sacral Spine Electric Stimulation  Type: Interferential  Placement: bilateral lumbar  Duration : 15 minutes  Patient Position: Supine                                               HEP: As above; handouts given to patient for all exercises. ______________________________________________________________________________________________________    Treatment/Session Assessment:    · Response to Treatment: Patient tolerated increased activities, including lifting, well with minimal to no complaints. Will continue to progress functional/ work related tasks as able. · Compliance with Program/Exercises: Compliant most of the time. · Recommendations/Intent for next treatment session: \"Next visit will focus on advancements to more challenging activities\". Progress functional and work related tasks as tolerated. Manual therapy and modalities as needed for pain.     Total Treatment Duration: 55 minutes   PT Patient Time In/Time Out  Time In: 0800  Time Out: [id number]    Melania Hitchcock PT

## 2017-03-02 ENCOUNTER — HOSPITAL ENCOUNTER (OUTPATIENT)
Dept: PHYSICAL THERAPY | Age: 52
Discharge: HOME | End: 2017-03-02
Payer: COMMERCIAL

## 2017-03-02 PROCEDURE — 97110 THERAPEUTIC EXERCISES: CPT

## 2017-03-02 PROCEDURE — 97014 ELECTRIC STIMULATION THERAPY: CPT

## 2017-03-02 NOTE — PROGRESS NOTES
Mila Smith  : [facility] [address], [address]  [phone]   [phone]        OUTPATIENT PHYSICAL THERAPY:Daily Note 3/2/2017    ICD-10: Treatment Diagnosis: low back pain (M54.5)  Precautions/Allergies:   Review of patient's allergies indicates no known allergies. Fall Risk Score: 1 (? 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 Pate is a 46 y.o. female presenting to physical therapy with complaints of low back pain which began 16 after transferring a nursing home patient from the bed to a wheelchair. Patient was seen in our clinic at the end of  and is returning with referral from her pain management doctors. She reports feeling much better than she did before, but having constant low back pain. She states her pain is 5/10 with minimal to no change based on positioning or activity level. Patient does have some limitations with dressing, bathing, driving, lifting, carrying, vacuuming, sitting in the car, walking long distances, and sleeping through the night without waking due to low back pain. Patient is out of work (CNA) but would like to be able to return once her pain is controlled and she can perform the duties required of her. Patient presents with increased pain, decreased strength, decreased ROM, decreased flexibility,impaired transfer ability, decreased activity tolerance, and overall impaired functional mobility.  Patient is a good candidate for skilled physical therapy interventions to include manual therapy, therapeutic exercise, balance training, gait training, transfer training, postural re-education, body mechanics training, and pain modalities as needed. PROBLEM LIST (Impacting functional limitations):  1. Decreased Strength  2. Decreased ADL/Functional Activities  3. Decreased Transfer Abilities  4. Decreased Ambulation Ability/Technique  5. Increased Pain  6. Decreased Activity Tolerance  7. Decreased Pacing Skills  8. Decreased Work Simplification/Energy Conservation Techniques  9. Decreased Flexibility/Joint Mobility INTERVENTIONS PLANNED:  1. Balance Exercise  2. Cold  3. Electrical Stimulation  4. Gait Training  5. Heat  6. Home Exercise Program (HEP)  7. Manual Therapy  8. Neuromuscular Re-education/Strengthening  9. Range of Motion (ROM)  10. Therapeutic Activites  11. Therapeutic Exercise/Strengthening  12. Transfer Training  13. Ultrasound (US)   TREATMENT PLAN:  Effective Dates: 2/9/17 to 3/27/17. Frequency/Duration: 2 times a week for 6 weeks (12 authorized sessions)  GOALS: (Goals have been discussed and agreed upon with patient.)  Short-Term Functional Goals: Time Frame: 2/9/17 to 3/3/17  1. Patient will be independent with HEP to improve core stability, LE flexibility, and overall functional mobility. 2. Patient will report no more than 4/10 low back pain at rest in order to demonstrate improved self pain control and tolerance. 3. Patient will be educated in and demonstrate proper squat lift technique in order to improve safety with lifting activities and reduce risk of future injuries. Discharge Goals: Time Frame: 2/9/17 to 3/27/17  1. Patient will improve gross core strength to 4/5 in order to improve lumbar stability for work related tasks. 2. Patient will be able to stand or walk with minimal rest breaks for 3 hours with minimal to no increase in low back pain in order to demonstrate improved activity tolerance.   3. Patient will be able to sleep through the night without waking due to low back pain in order to return to prior sleeping pattern for overall health and wellness. 4. Patient will be able to do housework, including vacuuming, with no increased low back pain in order to demonstrate return to prior level of function. 5. Patient will be able to lift and transfer 20 lbs with good form and no complaints of low back pain in order to improve safety with return to work activities. 6. Patient will improve Modified Oswestry Scale score to 10/50 from 18/50. Rehabilitation Potential For Stated Goals: Good  Regarding Inocencio Richard's therapy, I certify that the treatment plan above will be carried out by a therapist or under their direction. Thank you for this referral,  Bishop Melissa PT     Referring Physician Signature: Gio Chandra MD              Date                    The information in this section was collected on 2/9/17 (except where otherwise noted). HISTORY:   History of Present Injury/Illness (Reason for Referral):  Ms. Pate is a 46 y.o. female presenting to physical therapy with complaints of low back pain which began 8/16/16 after transferring a nursing home patient from the bed to a wheelchair. Patient was seen in our clinic at the end of 2016 and is returning with referral from her pain management doctors. She reports feeling much better than she did before, but having constant low back pain. She states her pain is 5/10 with minimal to no change based on positioning or activity level. Patient does have some limitations with dressing, bathing, driving, lifting, carrying, vacuuming, sitting in the car, walking long distances, and sleeping through the night without waking due to low back pain. Patient is out of work (CNA) but would like to be able to return once her pain is controlled and she can perform the duties required of her.  Patient presents with increased pain, decreased strength, decreased ROM, decreased flexibility,impaired transfer ability, decreased activity tolerance, and overall impaired functional mobility. Past Medical History/Comorbidities:   Ms. Cristina Lopez  has no past medical history on file. Ms. Cristina Lopez  has no past surgical history on file. She reports lumbar spine MRI indicating arthritis and disc bulge. Social History/Living Environment:     Not currently working. Patient is a CNA at [employer]. Prior Level of Function/Work/Activity:  Patient is independent with ADLs, self care, transfers, and ambulation, but reports needing additional time or modifications due to low back pain. Dominant Side:         RIGHT  Personal Factors:          Sex:  female        Age:  46 y.o. Current Medications:       Current Outpatient Prescriptions:     cyclobenzaprine (FLEXERIL) 5 mg tablet, Take 2 Tabs by mouth three (3) times daily as needed for Muscle Spasm(s). , Disp: 30 Tab, Rfl: 0    lidocaine HCl (ASPERCREME, LIDOCAINE,) 4 % crea, 1 Dose by Apply Externally route three (3) times daily as needed. , Disp: 1 Tube, Rfl: 0   Date Last Reviewed:  3/2/2017   Number of Personal Factors/Comorbidities that affect the Plan of Care:  (Given chronicity of low back pain) 1-2: MODERATE COMPLEXITY   EXAMINATION:   Patient denies any LE paresthesia. Patient denies any increase of symptoms with cough, sneeze or valsalva. Patient denies any saddle paresthesia or bowel/bladder deficits. Observation/Orthostatic Postural Assessment:          In standing: minimally increased lumbar lordosis, increased skin fold noted above L ilium, L iliac crest higher than R, mild trendelenburg during stance on L LE. Palpation:          Moderate tenderness to palpation of L gluteal, piriformis, and hip musculature with moderate tightness noted. Minimal tenderness R gluteal and piriformis with moderate tightness noted. Minimal tenderness over lumbar spine and paraspinals.     ROM:  Patient reported lumbar extension more uncomfortable than flexion  AROM (degrees)   Lumbar Flexion 40   Lumbar Extension 5   Moderate tightness noted in L hamstring muscle. Moderate tightness noted in bilateral piriformis muscles. Strength:  Mildly increased low back pain reported with resisted L hip flexion  Motion Tested Left   (*/5) Right  (*/5)   Hip Flexion 4 5   Hip Abduction 4 4+   Knee Extension 5 4   Ankle Dorsiflexion 5 4   Gross core strength 3/5 as observed with transfers and activation of transverse abdominus. Special Tests:          Neural tension tests: Passive straight leg raise (SLR) test is negative. Crossed SLR test is negative. Slump test is negative. SRINIVAS: positive for pain on L        Thigh thrust: negative bilaterally        Single leg long axis distraction: negative for change in low back pain        Lumbar traction: negative for change in low back pain        Bilateral knee valgus with sit to stand transfer. Trendelenburg: positive on L     Passive Accessory Motion: None assessed today. Will continue to assess as needed. Neurological Screen:              Myotomes: Key muscle strength testing through bilateral LE is WNL. Dermatomes: Sensation to light touch for bilateral LE is intact from L2 to S2, some diminished sensation reported at L1 on L. Reflexes: Patellar (L3/ L4): 2+ bilaterally (L LE required multiple attempts)                Achilles (S1/ S2): 2+ bilaterally   Functional Mobility:         Gait/Ambulation:  Mild antalgic pattern, mild trendelenburg on L, minimal bilateral genu valgus, mildly slow tamiko. Transfers:  Minimal difficulty with sit to stand transfers with minimal to moderate use of bilateral UE. Balance:          Sitting and standing balance grossly intact. Body Structures Involved:  1. Bones  2. Joints  3. Muscles Body Functions Affected:  1. Sensory/Pain  2. Neuromusculoskeletal  3. Movement Related Activities and Participation Affected:  1. Mobility  2.  Community, Social and [place]   Number of elements (examined above) that affect the Plan of Care:  (Chronicity of pain may affect plan of care, but patient with good progress with prior therapy.) 1-2: LOW COMPLEXITY   CLINICAL PRESENTATION:   Presentation: Evolving clinical presentation with changing clinical characteristics: MODERATE COMPLEXITY   CLINICAL DECISION MAKING:   Outcome Measure: Tool Used: Modified Oswestry Low Back Pain Questionnaire  Score:  Initial: 18/50  Most Recent: X/50 (Date: -- )   Interpretation of Score: Each section is scored on a 0-5 scale, 5 representing the greatest disability. The scores of each section are added together for a total score of 50. Score 0 1-10 11-20 21-30 31-40 41-49 50   Modifier CH CI CJ CK CL CM CN     Medical Necessity:   · Patient is expected to demonstrate progress in strength, range of motion, balance, coordination and functional technique to increase independence with ambulation, transfers, and work related tasks and improve safety during lifting, carrying, tranfers, ambulation, travel, and returning to work duties. · Skilled intervention continues to be required due to increased low back pain hindering return to prior functional level including work. Reason for Services/Other Comments:  · Patient continues to require skilled intervention due to increased low back pain hindering overall activity tolerance, daily tasks, and return to prior funcitonal level including work/ job duties. Use of outcome tool(s) and clinical judgement create a POC that gives a:  (Due to chronicity of pain and reports of constant pain at this time.  Patient has not yet returned to functional tasks which will be required for work which leaves patients predicted progress questionable.) Questionable prediction of patient's progress: MODERATE COMPLEXITY            TREATMENT:   (In addition to Assessment/Re-Assessment sessions the following treatments were rendered)  Pre-treatment Symptoms/Complaints: Patient reports some increased soreness over the last few days, possibly due to trying to increase activities at home and practicing lifting mechanics. Pain: Initial:   Pain Intensity 1: 6  Pain Location 1: Back  Pain Orientation 1: Lower  Post Session: Patient with some soreness reported with exercises, but decreased pain to 5/10. Therapeutic Exercise: (40 Minutes):  Exercises per grid below to improve mobility, strength, balance and coordination. Required minimal verbal cues to promote proper body alignment, promote proper body posture and promote proper body mechanics. Progressed resistance, range, repetitions and complexity of movement as indicated. Date:  2/21/17 Date:  3/2/17 Date:  2-15-17   Activity/Exercise Parameters Parameters Parameters   Transverse Abdominus (TA) contraction With all exercises With all activities 10 x 10 sec   Piriformis stretch Emphasis on hip external rotation, 3 x 30 seconds ---    SKTC 3 x 30 seconds ---    Clamshells --- --- [place] 3 x 10   NU step Level 3, x 10 minutes Level 5, x 10 minutes 10 minutes level 3   Mini squats --- With TA, 2 x 10 With TA 2 x 10   Sit to stand With TA, 2 x 10 With TA, 2 x 10 With TA 2 x 10   Calf raise --- --- 2 x 15   Calf stretch --- Slant board, 3 x 30 seconds Slant board 4 x 30 sec   Hip abduction --- --- Black band 3 x 10   Hip adduction --- --- 2 x 10   Lateral walks Red band at ankles, x 10 steps each side Red band at ankles, hallway x 1 each    Single leg stance With TA and green band rows, x 10 each With TA, airex, 3 x 20 seconds each    Lifting 4 inch step, 10 lbs, table to chair, x 5  4 inch step, 15 lbs, table to chair, x 7            Time spent with patient reviewing proper muscle recruitment and technique with exercises.     Manual Therapy (     ): none today    Therapeutic Modalities: for pain/ tightness:                Lumbo-Sacral Spine Heat  Type: Moist pack  Duration : 15 minutes  Patient Position: Supine                             Lumbo-Sacral Spine Electric Stimulation  Type: Interferential  Placement: bilateral lumbar  Duration : 15 minutes  Patient Position: Supine                                               HEP: As above; handouts given to patient for all exercises. ______________________________________________________________________________________________________    Treatment/Session Assessment:    · Response to Treatment: Patient with good performance of lifting and functional activities today. Required some cuing for form, but no complaints of increased pain reported. · Compliance with Program/Exercises: Compliant most of the time. · Recommendations/Intent for next treatment session: \"Next visit will focus on advancements to more challenging activities\". Progress functional and work related tasks as tolerated. Manual therapy and modalities as needed for pain.     Total Treatment Duration: 55 minutes   PT Patient Time In/Time Out  Time In: 0805  Time Out: 0900    Chandler Burt PT

## 2017-03-06 ENCOUNTER — HOSPITAL ENCOUNTER (OUTPATIENT)
Dept: PHYSICAL THERAPY | Age: 52
Discharge: HOME | End: 2017-03-06
Payer: COMMERCIAL

## 2017-03-06 PROCEDURE — 97014 ELECTRIC STIMULATION THERAPY: CPT

## 2017-03-06 PROCEDURE — 97110 THERAPEUTIC EXERCISES: CPT

## 2017-03-06 NOTE — PROGRESS NOTES
Adali Mateo  : 1965 [facility]  [address] [place] [address], [place], [address] [place] [address]  [phone]   [phone]        OUTPATIENT PHYSICAL THERAPY:Daily Note and Progress Report 3/6/2017    ICD-10: Treatment Diagnosis: low back pain (M54.5)  Precautions/Allergies:   Review of patient's allergies indicates no known allergies. Fall Risk Score: 1 (? 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 Pate is a 46 y.o. female presenting to physical therapy with complaints of low back pain which began 16 after transferring a nursing home patient from the bed to a wheelchair. Patient was seen in our clinic at the end of  and is returning with referral from her pain management doctors. She reports feeling much better than she did before, but having constant low back pain. She states her pain is 5/10 with minimal to no change based on positioning or activity level. Patient does have some limitations with dressing, bathing, driving, lifting, carrying, vacuuming, sitting in the car, walking long distances, and sleeping through the night without waking due to low back pain. Patient is out of work (CNA) but would like to be able to return once her pain is controlled and she can perform the duties required of her. Patient presents with increased pain, decreased strength, decreased ROM, decreased flexibility,impaired transfer ability, decreased activity tolerance, and overall impaired functional mobility.  Patient is a good candidate for skilled physical therapy interventions to include manual therapy, therapeutic exercise, balance training, gait training, transfer training, postural re-education, body mechanics training, and pain modalities as needed. PROGRESS NOTE 3/6/17: Patient has been seen for 6 sessions of physical therapy from 2/9/17 to 3/6/17. She reports feeling 40% better with no leg pain, improving functional mobility, being able to lift more, and focusing on keeping tummy tight and using proper body mechanics. She states she needs to continue progressing her lifting activities, including different heights and weights, and increased endurance with activities. She continues to report a constant 5/10 pain, even at rest, but states she feels as though she is getting used to it. Patient has met some of her goals set at initial visit and is progressing with the rest. She should benefit from continued skilled therapy to address remaining goals and deficits. PROBLEM LIST (Impacting functional limitations):  1. Decreased Strength  2. Decreased ADL/Functional Activities  3. Decreased Transfer Abilities  4. Decreased Ambulation Ability/Technique  5. Increased Pain  6. Decreased Activity Tolerance  7. Decreased Pacing Skills  8. Decreased Work Simplification/Energy Conservation Techniques  9. Decreased Flexibility/Joint Mobility INTERVENTIONS PLANNED:  1. Balance Exercise  2. Cold  3. Electrical Stimulation  4. Gait Training  5. Heat  6. Home Exercise Program (HEP)  7. Manual Therapy  8. Neuromuscular Re-education/Strengthening  9. Range of Motion (ROM)  10. Therapeutic Activites  11. Therapeutic Exercise/Strengthening  12. Transfer Training  13. Ultrasound (US)   TREATMENT PLAN:  Effective Dates: 2/9/17 to 3/27/17. Frequency/Duration: 2 times a week for 6 weeks (12 authorized sessions)  GOALS: (Goals have been discussed and agreed upon with patient.)  Short-Term Functional Goals: Time Frame: 2/9/17 to 3/3/17  1.  Patient will be independent with HEP to improve core stability, LE flexibility, and overall functional mobility. -GOAL MET  2. Patient will report no more than 4/10 low back pain at rest in order to demonstrate improved self pain control and tolerance. -ONGOING  3. Patient will be educated in and demonstrate proper squat lift technique in order to improve safety with lifting activities and reduce risk of future injuries. -GOAL MET  Discharge Goals: Time Frame: 2/9/17 to 3/27/17  1. Patient will improve gross core strength to 4/5 in order to improve lumbar stability for work related tasks. -ONGOING  2. Patient will be able to stand or walk with minimal rest breaks for 3 hours with minimal to no increase in low back pain in order to demonstrate improved activity tolerance. -ONGOING  3. Patient will be able to sleep through the night without waking due to low back pain in order to return to prior sleeping pattern for overall health and wellness. -ONGOING (reports an average of 2 times a night on 3/6/17)  4. Patient will be able to do housework, including vacuuming, with no increased low back pain in order to demonstrate return to prior level of function. -ONGOING  5. Patient will be able to lift and transfer 20 lbs with good form and no complaints of low back pain in order to improve safety with return to work activities. -ONGOING  6. Patient will improve Modified Oswestry Scale score to 10/50 from 18/50. -ONGOING (scored 21/50 on 3/6/17)  Rehabilitation Potential For Stated Goals: Good  Regarding Lissy Fabricio Richard's therapy, I certify that the treatment plan above will be carried out by a therapist or under their direction. Thank you for this referral,  Hubert Schaffer PT     Referring Physician Signature: Kimmie Dillon MD              Date                    The information in this section was collected on 3/6/17 (from 2/9/17) (except where otherwise noted).   HISTORY:   History of Present Injury/Illness (Reason for Referral):  Ms. Pate is a 46 y.o. female presenting to physical therapy with complaints of low back pain which began 8/16/16 after transferring a nursing home patient from the bed to a wheelchair. Patient was seen in our clinic at the end of 2016 and is returning with referral from her pain management doctors. She reports feeling much better than she did before, but having constant low back pain. She states her pain is 5/10 with minimal to no change based on positioning or activity level. Patient does have some limitations with dressing, bathing, driving, lifting, carrying, vacuuming, sitting in the car, walking long distances, and sleeping through the night without waking due to low back pain. Patient is out of work (CNA) but would like to be able to return once her pain is controlled and she can perform the duties required of her. Patient presents with increased pain, decreased strength, decreased ROM, decreased flexibility,impaired transfer ability, decreased activity tolerance, and overall impaired functional mobility. Past Medical History/Comorbidities:   Ms. Pate  has no past medical history on file. Ms. Pate  has no past surgical history on file. She reports lumbar spine MRI indicating arthritis and disc bulge. Social History/Living Environment:     Not currently working. Patient is a CNA at [employer]. Prior Level of Function/Work/Activity:  Patient is independent with ADLs, self care, transfers, and ambulation, but reports needing additional time or modifications due to low back pain. Dominant Side:         RIGHT  Personal Factors:          Sex:  female        Age:  46 y.o. Current Medications:       Current Outpatient Prescriptions:     cyclobenzaprine (FLEXERIL) 5 mg tablet, Take 2 Tabs by mouth three (3) times daily as needed for Muscle Spasm(s). , Disp: 30 Tab, Rfl: 0    lidocaine HCl (ASPERCREME, LIDOCAINE,) 4 % crea, 1 Dose by Apply Externally route three (3) times daily as needed. , Disp: 1 Tube, Rfl: 0   Date Last Reviewed:  3/6/2017 Number of Personal Factors/Comorbidities that affect the Plan of Care:  (Given chronicity of low back pain) 1-2: MODERATE COMPLEXITY   EXAMINATION:   Patient denies any LE paresthesia. Patient denies any increase of symptoms with cough, sneeze or valsalva. Patient denies any saddle paresthesia or bowel/bladder deficits. Observation/Orthostatic Postural Assessment:          In standing: minimally increased lumbar lordosis, increased skin fold noted above L ilium, L iliac crest higher than R, mild trendelenburg during stance on L LE. Palpation:          Minimal (from moderate) tenderness to palpation of L gluteal, piriformis, and hip musculature with moderate tightness noted. Minimal tenderness R gluteal and piriformis with moderate tightness noted. Minimal tenderness over lumbar spine and paraspinals. ROM:  Patient reported lumbar extension more uncomfortable than flexion  AROM (degrees)   Lumbar Flexion 50 (from 40)   Lumbar Extension 5   Moderate tightness noted in L hamstring muscle. Moderate tightness noted in bilateral piriformis muscles. Strength:  Mildly increased low back pain reported with resisted L hip flexion   Motion Tested Left   (*/5) Right  (*/5)   Hip Flexion 4+ (from 4) 5   Hip Abduction 4 4+   Knee Extension 5 4   Ankle Dorsiflexion 5 4   Gross core strength 3/5 as observed with transfers and activation of transverse abdominus. Special Tests:          Neural tension tests: Passive straight leg raise (SLR) test is negative. Crossed SLR test is negative. Slump test is negative. SRINIVAS: positive for pain on L        Thigh thrust: negative bilaterally        Single leg long axis distraction: negative for change in low back pain        Lumbar traction: negative for change in low back pain        Bilateral knee valgus with sit to stand transfer. Trendelenburg: positive on L     Passive Accessory Motion: None assessed today.  Will continue to assess as needed. Neurological Screen:              Myotomes: Key muscle strength testing through bilateral LE is WNL. Dermatomes: Sensation to light touch for bilateral LE is intact from L2 to S2, some diminished sensation reported at L1 on L. Reflexes: Patellar (L3/ L4): 2+ bilaterally (L LE required multiple attempts)                Achilles (S1/ S2): 2+ bilaterally   Functional Mobility:         Gait/Ambulation:  Mild antalgic pattern, mild trendelenburg on L, minimal bilateral genu valgus, mildly slow tamiko. Transfers:  Minimal difficulty with sit to stand transfers with minimal (from minimal to moderate) use of bilateral UE. Balance:          Sitting and standing balance grossly intact. Body Structures Involved:  1. Bones  2. Joints  3. Muscles Body Functions Affected:  1. Sensory/Pain  2. Neuromusculoskeletal  3. Movement Related Activities and Participation Affected:  1. Mobility  2. Community, Social and [facility]   Number of elements (examined above) that affect the Plan of Care:  (Chronicity of pain may affect plan of care, but patient with good progress with prior therapy.) 1-2: LOW COMPLEXITY   CLINICAL PRESENTATION:   Presentation: Evolving clinical presentation with changing clinical characteristics: MODERATE COMPLEXITY   CLINICAL DECISION MAKING:   Outcome Measure: Tool Used: Modified Oswestry Low Back Pain Questionnaire  Score:  Initial: 18/50  Most Recent: 21/50 (Date: 3/6/17 )   Interpretation of Score: Each section is scored on a 0-5 scale, 5 representing the greatest disability. The scores of each section are added together for a total score of 50.     Score 0 1-10 11-20 21-30 31-40 41-49 50   Modifier CH CI CJ CK CL CM CN     Medical Necessity:   · Patient is expected to demonstrate progress in strength, range of motion, balance, coordination and functional technique to increase independence with ambulation, transfers, and work related tasks and improve safety during lifting, carrying, tranfers, ambulation, travel, and returning to work duties. · Skilled intervention continues to be required due to increased low back pain hindering return to prior functional level including work. Reason for Services/Other Comments:  · Patient continues to require skilled intervention due to increased low back pain hindering overall activity tolerance, daily tasks, and return to prior funcitonal level including work/ job duties. Use of outcome tool(s) and clinical judgement create a POC that gives a:  (Due to chronicity of pain and reports of constant pain at this time. Patient has not yet returned to functional tasks which will be required for work which leaves patients predicted progress questionable.) Questionable prediction of patient's progress: MODERATE COMPLEXITY            TREATMENT:   (In addition to Assessment/Re-Assessment sessions the following treatments were rendered)  Pre-treatment Symptoms/Complaints: Patient with some increased pain this morning, but not sure why. No change in activity over the weekend. Some left sided pain noted this morning. Patient continues to report a constant 5/10 pain even at rest.  Pain: Initial:   Pain Intensity 1: 6  Pain Location 1: Back  Pain Orientation 1: Lower, Left  Post Session: Patient with some increased soreness with lifting activities, but reports feeling more loosened up by the end of treatment, reported 5/10 pain. Therapeutic Exercise: (45 Minutes):  Exercises per grid below to improve mobility, strength, balance and coordination. Required minimal verbal cues to promote proper body alignment, promote proper body posture and promote proper body mechanics. Progressed resistance, range, repetitions and complexity of movement as indicated.    Date:  2/21/17 Date:  3/2/17 Date:  3/6/17   Activity/Exercise Parameters Parameters Parameters   Transverse Abdominus (TA) contraction With all exercises With all activities With all activities Piriformis stretch Emphasis on hip external rotation, 3 x 30 seconds --- 3 x 30 seconds each   SKTC 3 x 30 seconds --- 3 x 30 seconds each   Clamshells --- --- ---   NU step Level 3, x 10 minutes Level 5, x 10 minutes Level 6, x 10 minutes   Mini squats --- With TA, 2 x 10 ---   Sit to stand With TA, 2 x 10 With TA, 2 x 10 With TA 2 x 10   Calf stretch --- Slant board, 3 x 30 seconds Slant board 4 x 30 sec   Lateral walks Red band at ankles, x 10 steps each side Red band at ankles, hallway x 1 each Green band, 2 x 20 steps each sire   Single leg stance With TA and green band rows, x 10 each With TA, airex, 3 x 20 seconds each With TA, Airex, green rows, 2 x 10 each   Lifting 4 inch step, 10 lbs, table to chair, x 5  4 inch step, 15 lbs, table to chair, x 7 4 inch step, lifting from 8 inch step with airex to high table, x 5   Quadratus lumborum stretch --- --- Gentle, supine, x 5 each side           Time spent with patient reviewing proper muscle recruitment and technique with exercises. Manual Therapy (     ): manual strength and ROM measurements    Therapeutic Modalities: for pain/ tightness:                 Lumbo-Sacral Spine Heat  Type: Moist pack  Duration : 15 minutes  Patient Position: Supine                             Lumbo-Sacral Spine Electric Stimulation  Type: Interferential  Placement: bilateral lumbar  Duration : 15 minutes  Patient Position: Supine                                               HEP: As above; handouts given to patient for all exercises. ______________________________________________________________________________________________________    Treatment/Session Assessment:    · Response to Treatment: Patient tolerated increased lifting activities with minimal complaints of increased soreness. Good technique and body mechanics demonstrated with lifting from lower level today. Some decreased soreness with stretching.   · Compliance with Program/Exercises: Compliant most of the time.  · Recommendations/Intent for next treatment session: \"Next visit will focus on advancements to more challenging activities\". Progress functional and work related tasks as tolerated. Manual therapy and modalities as needed for pain.     Total Treatment Duration: 60 minutes   PT Patient Time In/Time Out  Time In: 0700  Time Out: 0800    Bre Brown PT

## 2017-03-09 ENCOUNTER — HOSPITAL ENCOUNTER (OUTPATIENT)
Dept: PHYSICAL THERAPY | Age: 52
Discharge: HOME | End: 2017-03-09
Payer: COMMERCIAL

## 2017-03-09 PROCEDURE — 97014 ELECTRIC STIMULATION THERAPY: CPT

## 2017-03-09 PROCEDURE — 97110 THERAPEUTIC EXERCISES: CPT

## 2017-03-09 NOTE — PROGRESS NOTES
Tay Cruz  : [facility] [address], [address]  [phone]   [phone]        OUTPATIENT PHYSICAL THERAPY:Daily Note 3/9/2017    ICD-10: Treatment Diagnosis: low back pain (M54.5)  Precautions/Allergies:   Review of patient's allergies indicates no known allergies. Fall Risk Score: 1 (? 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 Mynor Quinn is a 46 y.o. female presenting to physical therapy with complaints of low back pain which began 16 after transferring a nursing home patient from the bed to a wheelchair. Patient was seen in our clinic at the end of  and is returning with referral from her pain management doctors. She reports feeling much better than she did before, but having constant low back pain. She states her pain is 5/10 with minimal to no change based on positioning or activity level. Patient does have some limitations with dressing, bathing, driving, lifting, carrying, vacuuming, sitting in the car, walking long distances, and sleeping through the night without waking due to low back pain. Patient is out of work (CNA) but would like to be able to return once her pain is controlled and she can perform the duties required of her. Patient presents with increased pain, decreased strength, decreased ROM, decreased flexibility,impaired transfer ability, decreased activity tolerance, and overall impaired functional mobility.  Patient is a good candidate for skilled physical therapy interventions to include manual therapy, therapeutic exercise, balance training, gait training, transfer training, postural re-education, body mechanics training, and pain modalities as needed. PROGRESS NOTE 3/6/17: Patient has been seen for 6 sessions of physical therapy from 2/9/17 to 3/6/17. She reports feeling 40% better with no leg pain, improving functional mobility, being able to lift more, and focusing on keeping tummy tight and using proper body mechanics. She states she needs to continue progressing her lifting activities, including different heights and weights, and increased endurance with activities. She continues to report a constant 5/10 pain, even at rest, but states she feels as though she is getting used to it. Patient has met some of her goals set at initial visit and is progressing with the rest. She should benefit from continued skilled therapy to address remaining goals and deficits. PROBLEM LIST (Impacting functional limitations):  1. Decreased Strength  2. Decreased ADL/Functional Activities  3. Decreased Transfer Abilities  4. Decreased Ambulation Ability/Technique  5. Increased Pain  6. Decreased Activity Tolerance  7. Decreased Pacing Skills  8. Decreased Work Simplification/Energy Conservation Techniques  9. Decreased Flexibility/Joint Mobility INTERVENTIONS PLANNED:  1. Balance Exercise  2. Cold  3. Electrical Stimulation  4. Gait Training  5. Heat  6. Home Exercise Program (HEP)  7. Manual Therapy  8. Neuromuscular Re-education/Strengthening  9. Range of Motion (ROM)  10. Therapeutic Activites  11. Therapeutic Exercise/Strengthening  12. Transfer Training  13. Ultrasound (US)   TREATMENT PLAN:  Effective Dates: 2/9/17 to 3/27/17. Frequency/Duration: 2 times a week for 6 weeks (12 authorized sessions)  GOALS: (Goals have been discussed and agreed upon with patient.)  Short-Term Functional Goals: Time Frame: 2/9/17 to 3/3/17  1.  Patient will be independent with HEP to improve core stability, LE flexibility, and overall functional mobility. -GOAL MET  2. Patient will report no more than 4/10 low back pain at rest in order to demonstrate improved self pain control and tolerance. -ONGOING  3. Patient will be educated in and demonstrate proper squat lift technique in order to improve safety with lifting activities and reduce risk of future injuries. -GOAL MET  Discharge Goals: Time Frame: 2/9/17 to 3/27/17  1. Patient will improve gross core strength to 4/5 in order to improve lumbar stability for work related tasks. -ONGOING  2. Patient will be able to stand or walk with minimal rest breaks for 3 hours with minimal to no increase in low back pain in order to demonstrate improved activity tolerance. -ONGOING  3. Patient will be able to sleep through the night without waking due to low back pain in order to return to prior sleeping pattern for overall health and wellness. -ONGOING (reports an average of 2 times a night on 3/6/17)  4. Patient will be able to do housework, including vacuuming, with no increased low back pain in order to demonstrate return to prior level of function. -ONGOING  5. Patient will be able to lift and transfer 20 lbs with good form and no complaints of low back pain in order to improve safety with return to work activities. -ONGOING  6. Patient will improve Modified Oswestry Scale score to 10/50 from 18/50. -ONGOING (scored 21/50 on 3/6/17)  Rehabilitation Potential For Stated Goals: Good  Regarding Rustam Richard's therapy, I certify that the treatment plan above will be carried out by a therapist or under their direction. Thank you for this referral,  Shannon Acosta PT     Referring Physician Signature: Petrona Kirkpatrick MD              Date                    The information in this section was collected on 3/6/17 (from 2/9/17) (except where otherwise noted).   HISTORY:   History of Present Injury/Illness (Reason for Referral):  Ms. Pate is a 46 y.o. female presenting to physical therapy with complaints of low back pain which began 8/16/16 after transferring a nursing home patient from the bed to a wheelchair. Patient was seen in our clinic at the end of 2016 and is returning with referral from her pain management doctors. She reports feeling much better than she did before, but having constant low back pain. She states her pain is 5/10 with minimal to no change based on positioning or activity level. Patient does have some limitations with dressing, bathing, driving, lifting, carrying, vacuuming, sitting in the car, walking long distances, and sleeping through the night without waking due to low back pain. Patient is out of work (CNA) but would like to be able to return once her pain is controlled and she can perform the duties required of her. Patient presents with increased pain, decreased strength, decreased ROM, decreased flexibility,impaired transfer ability, decreased activity tolerance, and overall impaired functional mobility. Past Medical History/Comorbidities:   Ms. Isaac Contreras  has no past medical history on file. Ms. Isaac Contreras  has no past surgical history on file. She reports lumbar spine MRI indicating arthritis and disc bulge. Social History/Living Environment:     Not currently working. Patient is a CNA at [employer]. Prior Level of Function/Work/Activity:  Patient is independent with ADLs, self care, transfers, and ambulation, but reports needing additional time or modifications due to low back pain. Dominant Side:         RIGHT  Personal Factors:          Sex:  female        Age:  46 y.o. Current Medications:       Current Outpatient Prescriptions:     cyclobenzaprine (FLEXERIL) 5 mg tablet, Take 2 Tabs by mouth three (3) times daily as needed for Muscle Spasm(s). , Disp: 30 Tab, Rfl: 0    lidocaine HCl (ASPERCREME, LIDOCAINE,) 4 % crea, 1 Dose by Apply Externally route three (3) times daily as needed. , Disp: 1 Tube, Rfl: 0   Date Last Reviewed:  3/9/2017   Number of Personal Factors/Comorbidities that affect the Plan of Care:  (Given chronicity of low back pain) 1-2: MODERATE COMPLEXITY   EXAMINATION:   Patient denies any LE paresthesia. Patient denies any increase of symptoms with cough, sneeze or valsalva. Patient denies any saddle paresthesia or bowel/bladder deficits. Observation/Orthostatic Postural Assessment:          In standing: minimally increased lumbar lordosis, increased skin fold noted above L ilium, L iliac crest higher than R, mild trendelenburg during stance on L LE. Palpation:          Minimal (from moderate) tenderness to palpation of L gluteal, piriformis, and hip musculature with moderate tightness noted. Minimal tenderness R gluteal and piriformis with moderate tightness noted. Minimal tenderness over lumbar spine and paraspinals. ROM:  Patient reported lumbar extension more uncomfortable than flexion  AROM (degrees)   Lumbar Flexion 50 (from 40)   Lumbar Extension 5   Moderate tightness noted in L hamstring muscle. Moderate tightness noted in bilateral piriformis muscles. Strength:  Mildly increased low back pain reported with resisted L hip flexion   Motion Tested Left   (*/5) Right  (*/5)   Hip Flexion 4+ (from 4) 5   Hip Abduction 4 4+   Knee Extension 5 4   Ankle Dorsiflexion 5 4   Gross core strength 3/5 as observed with transfers and activation of transverse abdominus. Special Tests:          Neural tension tests: Passive straight leg raise (SLR) test is negative. Crossed SLR test is negative. Slump test is negative. SRINIVAS: positive for pain on L        Thigh thrust: negative bilaterally        Single leg long axis distraction: negative for change in low back pain        Lumbar traction: negative for change in low back pain        Bilateral knee valgus with sit to stand transfer. Trendelenburg: positive on L     Passive Accessory Motion: None assessed today. Will continue to assess as needed.     Neurological Screen: Myotomes: Key muscle strength testing through bilateral LE is WNL. Dermatomes: Sensation to light touch for bilateral LE is intact from L2 to S2, some diminished sensation reported at L1 on L. Reflexes: Patellar (L3/ L4): 2+ bilaterally (L LE required multiple attempts)                Achilles (S1/ S2): 2+ bilaterally   Functional Mobility:         Gait/Ambulation:  Mild antalgic pattern, mild trendelenburg on L, minimal bilateral genu valgus, mildly slow tamiko. Transfers:  Minimal difficulty with sit to stand transfers with minimal (from minimal to moderate) use of bilateral UE. Balance:          Sitting and standing balance grossly intact. Body Structures Involved:  1. Bones  2. Joints  3. Muscles Body Functions Affected:  1. Sensory/Pain  2. Neuromusculoskeletal  3. Movement Related Activities and Participation Affected:  1. Mobility  2. Community, Social and [place]   Number of elements (examined above) that affect the Plan of Care:  (Chronicity of pain may affect plan of care, but patient with good progress with prior therapy.) 1-2: LOW COMPLEXITY   CLINICAL PRESENTATION:   Presentation: Evolving clinical presentation with changing clinical characteristics: MODERATE COMPLEXITY   CLINICAL DECISION MAKING:   Outcome Measure: Tool Used: Modified Oswestry Low Back Pain Questionnaire  Score:  Initial: 18/50  Most Recent: 21/50 (Date: 3/6/17 )   Interpretation of Score: Each section is scored on a 0-5 scale, 5 representing the greatest disability. The scores of each section are added together for a total score of 50.     Score 0 1-10 11-20 21-30 31-40 41-49 50   Modifier CH CI CJ CK CL CM CN     Medical Necessity:   · Patient is expected to demonstrate progress in strength, range of motion, balance, coordination and functional technique to increase independence with ambulation, transfers, and work related tasks and improve safety during lifting, carrying, tranfers, ambulation, travel, and returning to work duties. · Skilled intervention continues to be required due to increased low back pain hindering return to prior functional level including work. Reason for Services/Other Comments:  · Patient continues to require skilled intervention due to increased low back pain hindering overall activity tolerance, daily tasks, and return to prior funcitonal level including work/ job duties. Use of outcome tool(s) and clinical judgement create a POC that gives a:  (Due to chronicity of pain and reports of constant pain at this time. Patient has not yet returned to functional tasks which will be required for work which leaves patients predicted progress questionable.) Questionable prediction of patient's progress: MODERATE COMPLEXITY            TREATMENT:   (In addition to Assessment/Re-Assessment sessions the following treatments were rendered)  Pre-treatment Symptoms/Complaints: Patient reported some increased low back pain, or muscle soreness after last session, but feeling better overall. Pain: Initial:   Pain Intensity 1: 5  Pain Location 1: Back  Pain Orientation 1: Lower  Post Session: Patient with some tightness, but 5/10 pain at end of treatment. Therapeutic Exercise: (40 Minutes):  Exercises per grid below to improve mobility, strength, balance and coordination. Required minimal verbal cues to promote proper body alignment, promote proper body posture and promote proper body mechanics. Progressed resistance, range, repetitions and complexity of movement as indicated.    Date:  3/9/17 Date:  3/2/17 Date:  3/6/17   Activity/Exercise Parameters Parameters Parameters   Transverse Abdominus (TA) contraction With all exercises With all activities With all activities   Piriformis stretch --- --- 3 x 30 seconds each   SKTC --- --- 3 x 30 seconds each   Clamshells --- --- ---   NU step Level 6, x 10 minutes Level 5, x 10 minutes Level 6, x 10 minutes   Mini squats With TA, 2 x 10 With TA, 2 x 10 --- Sit to stand With TA, 2 x 12 With TA, 2 x 10 With TA 2 x 10   Calf stretch Slant board, 4 x 30 seconds Slant board, 3 x 30 seconds Slant board 4 x 30 sec   Lateral walks Red band at --- Red band at ankles, hallway x 1 each Green band, 2 x 20 steps each sire   Single leg stance With TA, Airex, green band rows, x 10 each With TA, airex, 3 x 20 seconds each With TA, Airex, green rows, 2 x 10 each   Lifting 6 inch step, 10 lbs, from stool carried across clinic then lifting 5 lbs overhead  6 inch step from 8 inch with airex, 10 lbs, x 3 to left and x 3 to right 4 inch step, 15 lbs, table to chair, x 7 4 inch step, lifting from 8 inch step with airex to high table, x 5   Quadratus lumborum stretch --- --- Gentle, supine, x 5 each side           Time spent with patient reviewing proper muscle recruitment and technique with exercises. Manual Therapy (     ): none today    Therapeutic Modalities: for pain/ tightness:                 Lumbo-Sacral Spine Heat  Type: Moist pack  Duration : 15 minutes  Patient Position: Supine                             Lumbo-Sacral Spine Electric Stimulation  Type: Interferential  Placement: bilateral lumbar  Duration : 15 minutes  Patient Position: Supine                                               HEP: As above; handouts given to patient for all exercises. ______________________________________________________________________________________________________    Treatment/Session Assessment:    · Response to Treatment: Patient with some increased low back soreness with lifting and carrying activities. Some fatigue noted by end of treatment, but no increased pain. · Compliance with Program/Exercises: Compliant most of the time. · Recommendations/Intent for next treatment session: \"Next visit will focus on advancements to more challenging activities\". Progress functional and work related tasks as tolerated. Manual therapy and modalities as needed for pain.     Total Treatment Duration: 55 minutes   PT Patient Time In/Time Out  Time In: 0700  Time Out: 0755    Tiffanie Hastinsg, PT

## 2017-03-13 ENCOUNTER — HOSPITAL ENCOUNTER (OUTPATIENT)
Dept: PHYSICAL THERAPY | Age: 52
Discharge: HOME | End: 2017-03-13
Payer: COMMERCIAL

## 2017-03-13 PROCEDURE — 97110 THERAPEUTIC EXERCISES: CPT

## 2017-03-13 PROCEDURE — 97014 ELECTRIC STIMULATION THERAPY: CPT

## 2017-03-13 NOTE — PROGRESS NOTES
Jessica Sosa  : [facility] [address], [place], [address] [place] [address]  [phone]   [phone]        OUTPATIENT PHYSICAL THERAPY:Daily Note 3/13/2017    ICD-10: Treatment Diagnosis: low back pain (M54.5)  Precautions/Allergies:   Review of patient's allergies indicates no known allergies. Fall Risk Score: 1 (? 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 Pate is a 46 y.o. female presenting to physical therapy with complaints of low back pain which began 16 after transferring a nursing home patient from the bed to a wheelchair. Patient was seen in our clinic at the end of  and is returning with referral from her pain management doctors. She reports feeling much better than she did before, but having constant low back pain. She states her pain is 5/10 with minimal to no change based on positioning or activity level. Patient does have some limitations with dressing, bathing, driving, lifting, carrying, vacuuming, sitting in the car, walking long distances, and sleeping through the night without waking due to low back pain. Patient is out of work (CNA) but would like to be able to return once her pain is controlled and she can perform the duties required of her. Patient presents with increased pain, decreased strength, decreased ROM, decreased flexibility,impaired transfer ability, decreased activity tolerance, and overall impaired functional mobility.  Patient is a good candidate for skilled physical therapy interventions to include manual therapy, therapeutic exercise, balance training, gait training, transfer training, postural re-education, body mechanics training, and pain modalities as needed. PROGRESS NOTE 3/6/17: Patient has been seen for 6 sessions of physical therapy from 2/9/17 to 3/6/17. She reports feeling 40% better with no leg pain, improving functional mobility, being able to lift more, and focusing on keeping tummy tight and using proper body mechanics. She states she needs to continue progressing her lifting activities, including different heights and weights, and increased endurance with activities. She continues to report a constant 5/10 pain, even at rest, but states she feels as though she is getting used to it. Patient has met some of her goals set at initial visit and is progressing with the rest. She should benefit from continued skilled therapy to address remaining goals and deficits. PROBLEM LIST (Impacting functional limitations):  1. Decreased Strength  2. Decreased ADL/Functional Activities  3. Decreased Transfer Abilities  4. Decreased Ambulation Ability/Technique  5. Increased Pain  6. Decreased Activity Tolerance  7. Decreased Pacing Skills  8. Decreased Work Simplification/Energy Conservation Techniques  9. Decreased Flexibility/Joint Mobility INTERVENTIONS PLANNED:  1. Balance Exercise  2. Cold  3. Electrical Stimulation  4. Gait Training  5. Heat  6. Home Exercise Program (HEP)  7. Manual Therapy  8. Neuromuscular Re-education/Strengthening  9. Range of Motion (ROM)  10. Therapeutic Activites  11. Therapeutic Exercise/Strengthening  12. Transfer Training  13. Ultrasound (US)   TREATMENT PLAN:  Effective Dates: 2/9/17 to 3/27/17. Frequency/Duration: 2 times a week for 6 weeks (12 authorized sessions)  GOALS: (Goals have been discussed and agreed upon with patient.)  Short-Term Functional Goals: Time Frame: 2/9/17 to 3/3/17  1.  Patient will be independent with HEP to improve core stability, LE flexibility, and overall functional mobility. -GOAL MET  2. Patient will report no more than 4/10 low back pain at rest in order to demonstrate improved self pain control and tolerance. -ONGOING  3. Patient will be educated in and demonstrate proper squat lift technique in order to improve safety with lifting activities and reduce risk of future injuries. -GOAL MET  Discharge Goals: Time Frame: 2/9/17 to 3/27/17  1. Patient will improve gross core strength to 4/5 in order to improve lumbar stability for work related tasks. -ONGOING  2. Patient will be able to stand or walk with minimal rest breaks for 3 hours with minimal to no increase in low back pain in order to demonstrate improved activity tolerance. -ONGOING  3. Patient will be able to sleep through the night without waking due to low back pain in order to return to prior sleeping pattern for overall health and wellness. -ONGOING (reports an average of 2 times a night on 3/6/17)  4. Patient will be able to do housework, including vacuuming, with no increased low back pain in order to demonstrate return to prior level of function. -ONGOING  5. Patient will be able to lift and transfer 20 lbs with good form and no complaints of low back pain in order to improve safety with return to work activities. -ONGOING  6. Patient will improve Modified Oswestry Scale score to 10/50 from 18/50. -ONGOING (scored 21/50 on 3/6/17)  Rehabilitation Potential For Stated Goals: Good  Regarding Ralph Richard's therapy, I certify that the treatment plan above will be carried out by a therapist or under their direction. Thank you for this referral,  Yulia Sheppard PT     Referring Physician Signature: Tona Alejandre MD              Date                    The information in this section was collected on 3/6/17 (from 2/9/17) (except where otherwise noted).   HISTORY:   History of Present Injury/Illness (Reason for Referral):  Ms. Pate is a 46 y.o. female presenting to physical therapy with complaints of low back pain which began 8/16/16 after transferring a nursing home patient from the bed to a wheelchair. Patient was seen in our clinic at the end of 2016 and is returning with referral from her pain management doctors. She reports feeling much better than she did before, but having constant low back pain. She states her pain is 5/10 with minimal to no change based on positioning or activity level. Patient does have some limitations with dressing, bathing, driving, lifting, carrying, vacuuming, sitting in the car, walking long distances, and sleeping through the night without waking due to low back pain. Patient is out of work (CNA) but would like to be able to return once her pain is controlled and she can perform the duties required of her. Patient presents with increased pain, decreased strength, decreased ROM, decreased flexibility,impaired transfer ability, decreased activity tolerance, and overall impaired functional mobility. Past Medical History/Comorbidities:   Ms. Pate  has no past medical history on file. Ms. Pate  has no past surgical history on file. She reports lumbar spine MRI indicating arthritis and disc bulge. Social History/Living Environment:     Not currently working. Patient is a CNA at [email or web address]. Prior Level of Function/Work/Activity:  Patient is independent with ADLs, self care, transfers, and ambulation, but reports needing additional time or modifications due to low back pain. Dominant Side:         RIGHT  Personal Factors:          Sex:  female        Age:  46 y.o. Current Medications:       Current Outpatient Prescriptions:     cyclobenzaprine (FLEXERIL) 5 mg tablet, Take 2 Tabs by mouth three (3) times daily as needed for Muscle Spasm(s). , Disp: 30 Tab, Rfl: 0    lidocaine HCl (ASPERCREME, LIDOCAINE,) 4 % crea, 1 Dose by Apply Externally route three (3) times daily as needed. , Disp: 1 Tube, Rfl: 0   Date Last Reviewed:  3/13/2017   Number of Personal Factors/Comorbidities that affect the Plan of Care:  (Given chronicity of low back pain) 1-2: MODERATE COMPLEXITY   EXAMINATION:   Patient denies any LE paresthesia. Patient denies any increase of symptoms with cough, sneeze or valsalva. Patient denies any saddle paresthesia or bowel/bladder deficits. Observation/Orthostatic Postural Assessment:          In standing: minimally increased lumbar lordosis, increased skin fold noted above L ilium, L iliac crest higher than R, mild trendelenburg during stance on L LE. Palpation:          Minimal (from moderate) tenderness to palpation of L gluteal, piriformis, and hip musculature with moderate tightness noted. Minimal tenderness R gluteal and piriformis with moderate tightness noted. Minimal tenderness over lumbar spine and paraspinals. ROM:  Patient reported lumbar extension more uncomfortable than flexion  AROM (degrees)   Lumbar Flexion 50 (from 40)   Lumbar Extension 5   Moderate tightness noted in L hamstring muscle. Moderate tightness noted in bilateral piriformis muscles. Strength:  Mildly increased low back pain reported with resisted L hip flexion   Motion Tested Left   (*/5) Right  (*/5)   Hip Flexion 4+ (from 4) 5   Hip Abduction 4 4+   Knee Extension 5 4   Ankle Dorsiflexion 5 4   Gross core strength 3/5 as observed with transfers and activation of transverse abdominus. Special Tests:          Neural tension tests: Passive straight leg raise (SLR) test is negative. Crossed SLR test is negative. Slump test is negative. SRINIVAS: positive for pain on L        Thigh thrust: negative bilaterally        Single leg long axis distraction: negative for change in low back pain        Lumbar traction: negative for change in low back pain        Bilateral knee valgus with sit to stand transfer. Trendelenburg: positive on L     Passive Accessory Motion: None assessed today. Will continue to assess as needed.     Neurological Screen: Myotomes: Key muscle strength testing through bilateral LE is WNL. Dermatomes: Sensation to light touch for bilateral LE is intact from L2 to S2, some diminished sensation reported at L1 on L. Reflexes: Patellar (L3/ L4): 2+ bilaterally (L LE required multiple attempts)                Achilles (S1/ S2): 2+ bilaterally   Functional Mobility:         Gait/Ambulation:  Mild antalgic pattern, mild trendelenburg on L, minimal bilateral genu valgus, mildly slow tamiko. Transfers:  Minimal difficulty with sit to stand transfers with minimal (from minimal to moderate) use of bilateral UE. Balance:          Sitting and standing balance grossly intact. Body Structures Involved:  1. Bones  2. Joints  3. Muscles Body Functions Affected:  1. Sensory/Pain  2. Neuromusculoskeletal  3. Movement Related Activities and Participation Affected:  1. Mobility  2. Community, Social and [place]   Number of elements (examined above) that affect the Plan of Care:  (Chronicity of pain may affect plan of care, but patient with good progress with prior therapy.) 1-2: LOW COMPLEXITY   CLINICAL PRESENTATION:   Presentation: Evolving clinical presentation with changing clinical characteristics: MODERATE COMPLEXITY   CLINICAL DECISION MAKING:   Outcome Measure: Tool Used: Modified Oswestry Low Back Pain Questionnaire  Score:  Initial: 18/50  Most Recent: 21/50 (Date: 3/6/17 )   Interpretation of Score: Each section is scored on a 0-5 scale, 5 representing the greatest disability. The scores of each section are added together for a total score of 50.     Score 0 1-10 11-20 21-30 31-40 41-49 50   Modifier CH CI CJ CK CL CM CN     Medical Necessity:   · Patient is expected to demonstrate progress in strength, range of motion, balance, coordination and functional technique to increase independence with ambulation, transfers, and work related tasks and improve safety during lifting, carrying, tranfers, ambulation, travel, and returning to work duties. · Skilled intervention continues to be required due to increased low back pain hindering return to prior functional level including work. Reason for Services/Other Comments:  · Patient continues to require skilled intervention due to increased low back pain hindering overall activity tolerance, daily tasks, and return to prior funcitonal level including work/ job duties. Use of outcome tool(s) and clinical judgement create a POC that gives a:  (Due to chronicity of pain and reports of constant pain at this time. Patient has not yet returned to functional tasks which will be required for work which leaves patients predicted progress questionable.) Questionable prediction of patient's progress: MODERATE COMPLEXITY            TREATMENT:   (In addition to Assessment/Re-Assessment sessions the following treatments were rendered)  Pre-treatment Symptoms/Complaints: Patient states she felt good after last time and wasn't as sore as she expected to be. Reports some increased low back pain this morning, possibly due to the temperature changes. Pain: Initial:   Pain Intensity 1: 7  Pain Location 1: Back  Pain Orientation 1: Lower  Post Session: Patient reported decreased pain at end of treatment to 5/10. Therapeutic Exercise: (40 Minutes):  Exercises per grid below to improve mobility, strength, balance and coordination. Required minimal verbal cues to promote proper body alignment, promote proper body posture and promote proper body mechanics. Progressed resistance, range, repetitions and complexity of movement as indicated.    Date:  3/9/17 Date:  3/13/17 Date:  3/6/17   Activity/Exercise Parameters Parameters Parameters   Transverse Abdominus (TA) contraction With all exercises With all activities With all activities   Piriformis stretch --- --- 3 x 30 seconds each   SKTC --- --- 3 x 30 seconds each   Clamshells --- --- ---   NU step Level 6, x 10 minutes Level 6, x 10 minutes Level 6, x 10 minutes   Mini squats With TA, 2 x 10 With TA, 2 x 10 ---   Sit to stand With TA, 2 x 12 With TA, 2 x 10 With TA 2 x 10   Calf stretch Slant board, 4 x 30 seconds Slant board, 4 x 30 seconds Slant board 4 x 30 sec   Lateral walks Red band at --- Red band at ankles, hallway x 1 each Green band, 2 x 20 steps each sire   Single leg stance With TA, Airex, green band rows, x 10 each --- With TA, Airex, green rows, 2 x 10 each   Lifting 6 inch step, 10 lbs, from stool carried across clinic then lifting 5 lbs overhead  6 inch step from 8 inch with airex, 10 lbs, x 3 to left and x 3 to right 4 inch step, 10 lbs, from stool carried across clinic then lifting overhead  4 inch step from 8 inch with airex, 10 lbs, x 5 to left and x 5 to right 4 inch step, lifting from 8 inch step with airex to high table, x 5   Quadratus lumborum stretch --- --- Gentle, supine, x 5 each side           Time spent with patient reviewing proper muscle recruitment and technique with exercises. Manual Therapy (     ): none today    Therapeutic Modalities: for pain/ tightness:                 Lumbo-Sacral Spine Heat  Type: Moist pack  Duration : 15 minutes  Patient Position: Supine                             Lumbo-Sacral Spine Electric Stimulation  Type: Interferential  Placement: bilateral lumbar  Duration : 15 minutes  Patient Position: Supine                                               HEP: As above; handouts given to patient for all exercises. ______________________________________________________________________________________________________    Treatment/Session Assessment:    · Response to Treatment: Patient tolerated exercises well with improved body mechanics requiring less cuing. Some tightness reported with lifting from lower surface, but decreased overall pain at end of treatment. · Compliance with Program/Exercises: Compliant most of the time. · Recommendations/Intent for next treatment session:  \"Next visit will focus on advancements to more challenging activities\". Progress functional and work related tasks as tolerated. Manual therapy and modalities as needed for pain.     Total Treatment Duration: 55 minutes   PT Patient Time In/Time Out  Time In: 0700  Time Out: 0755    Gildardo Dejesus PT

## 2017-03-16 ENCOUNTER — HOSPITAL ENCOUNTER (OUTPATIENT)
Dept: PHYSICAL THERAPY | Age: 52
Discharge: HOME | End: 2017-03-16
Payer: COMMERCIAL

## 2017-03-16 PROCEDURE — 97110 THERAPEUTIC EXERCISES: CPT

## 2017-03-16 PROCEDURE — 97140 MANUAL THERAPY 1/> REGIONS: CPT

## 2017-03-16 PROCEDURE — 97014 ELECTRIC STIMULATION THERAPY: CPT

## 2017-03-16 NOTE — PROGRESS NOTES
Jordana Hill  : [facility] [address], [place], [address] [place] [address]  [phone]   [phone]        OUTPATIENT PHYSICAL THERAPY:Daily Note 3/16/2017    ICD-10: Treatment Diagnosis: low back pain (M54.5)  Precautions/Allergies:   Review of patient's allergies indicates no known allergies. Fall Risk Score: 1 (? 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 Chichi Krishnamurthy is a 46 y.o. female presenting to physical therapy with complaints of low back pain which began 16 after transferring a nursing home patient from the bed to a wheelchair. Patient was seen in our clinic at the end of  and is returning with referral from her pain management doctors. She reports feeling much better than she did before, but having constant low back pain. She states her pain is 5/10 with minimal to no change based on positioning or activity level. Patient does have some limitations with dressing, bathing, driving, lifting, carrying, vacuuming, sitting in the car, walking long distances, and sleeping through the night without waking due to low back pain. Patient is out of work (CNA) but would like to be able to return once her pain is controlled and she can perform the duties required of her. Patient presents with increased pain, decreased strength, decreased ROM, decreased flexibility,impaired transfer ability, decreased activity tolerance, and overall impaired functional mobility.  Patient is a good candidate for skilled physical therapy interventions to include manual therapy, therapeutic exercise, balance training, gait training, transfer training, postural re-education, body mechanics training, and pain modalities as needed. PROGRESS NOTE 3/6/17: Patient has been seen for 6 sessions of physical therapy from 2/9/17 to 3/6/17. She reports feeling 40% better with no leg pain, improving functional mobility, being able to lift more, and focusing on keeping tummy tight and using proper body mechanics. She states she needs to continue progressing her lifting activities, including different heights and weights, and increased endurance with activities. She continues to report a constant 5/10 pain, even at rest, but states she feels as though she is getting used to it. Patient has met some of her goals set at initial visit and is progressing with the rest. She should benefit from continued skilled therapy to address remaining goals and deficits. PROBLEM LIST (Impacting functional limitations):  1. Decreased Strength  2. Decreased ADL/Functional Activities  3. Decreased Transfer Abilities  4. Decreased Ambulation Ability/Technique  5. Increased Pain  6. Decreased Activity Tolerance  7. Decreased Pacing Skills  8. Decreased Work Simplification/Energy Conservation Techniques  9. Decreased Flexibility/Joint Mobility INTERVENTIONS PLANNED:  1. Balance Exercise  2. Cold  3. Electrical Stimulation  4. Gait Training  5. Heat  6. Home Exercise Program (HEP)  7. Manual Therapy  8. Neuromuscular Re-education/Strengthening  9. Range of Motion (ROM)  10. Therapeutic Activites  11. Therapeutic Exercise/Strengthening  12. Transfer Training  13. Ultrasound (US)   TREATMENT PLAN:  Effective Dates: 2/9/17 to 3/27/17. Frequency/Duration: 2 times a week for 6 weeks (12 authorized sessions)  GOALS: (Goals have been discussed and agreed upon with patient.)  Short-Term Functional Goals: Time Frame: 2/9/17 to 3/3/17  1.  Patient will be independent with HEP to improve core stability, LE flexibility, and overall functional mobility. -GOAL MET  2. Patient will report no more than 4/10 low back pain at rest in order to demonstrate improved self pain control and tolerance. -ONGOING  3. Patient will be educated in and demonstrate proper squat lift technique in order to improve safety with lifting activities and reduce risk of future injuries. -GOAL MET  Discharge Goals: Time Frame: 2/9/17 to 3/27/17  1. Patient will improve gross core strength to 4/5 in order to improve lumbar stability for work related tasks. -ONGOING  2. Patient will be able to stand or walk with minimal rest breaks for 3 hours with minimal to no increase in low back pain in order to demonstrate improved activity tolerance. -ONGOING  3. Patient will be able to sleep through the night without waking due to low back pain in order to return to prior sleeping pattern for overall health and wellness. -ONGOING (reports an average of 2 times a night on 3/6/17)  4. Patient will be able to do housework, including vacuuming, with no increased low back pain in order to demonstrate return to prior level of function. -ONGOING  5. Patient will be able to lift and transfer 20 lbs with good form and no complaints of low back pain in order to improve safety with return to work activities. -ONGOING  6. Patient will improve Modified Oswestry Scale score to 10/50 from 18/50. -ONGOING (scored 21/50 on 3/6/17)  Rehabilitation Potential For Stated Goals: Good  Regarding Olivaapolonia Johnson Jose Manuel's therapy, I certify that the treatment plan above will be carried out by a therapist or under their direction. Thank you for this referral,  Darlene Wilson PT     Referring Physician Signature: Babita Brar MD              Date                    The information in this section was collected on 3/6/17 (from 2/9/17) (except where otherwise noted).   HISTORY:   History of Present Injury/Illness (Reason for Referral):  Ms. Pate is a 46 y.o. female presenting to physical therapy with complaints of low back pain which began 8/16/16 after transferring a nursing home patient from the bed to a wheelchair. Patient was seen in our clinic at the end of 2016 and is returning with referral from her pain management doctors. She reports feeling much better than she did before, but having constant low back pain. She states her pain is 5/10 with minimal to no change based on positioning or activity level. Patient does have some limitations with dressing, bathing, driving, lifting, carrying, vacuuming, sitting in the car, walking long distances, and sleeping through the night without waking due to low back pain. Patient is out of work (CNA) but would like to be able to return once her pain is controlled and she can perform the duties required of her. Patient presents with increased pain, decreased strength, decreased ROM, decreased flexibility,impaired transfer ability, decreased activity tolerance, and overall impaired functional mobility. Past Medical History/Comorbidities:   Ms. Pate  has no past medical history on file. Ms. Pate  has no past surgical history on file. She reports lumbar spine MRI indicating arthritis and disc bulge. Social History/Living Environment:     Not currently working. Patient is a CNA at [employer]. Prior Level of Function/Work/Activity:  Patient is independent with ADLs, self care, transfers, and ambulation, but reports needing additional time or modifications due to low back pain. Dominant Side:         RIGHT  Personal Factors:          Sex:  female        Age:  46 y.o. Current Medications:       Current Outpatient Prescriptions:     cyclobenzaprine (FLEXERIL) 5 mg tablet, Take 2 Tabs by mouth three (3) times daily as needed for Muscle Spasm(s). , Disp: 30 Tab, Rfl: 0    lidocaine HCl (ASPERCREME, LIDOCAINE,) 4 % crea, 1 Dose by Apply Externally route three (3) times daily as needed. , Disp: 1 Tube, Rfl: 0   Date Last Reviewed:  3/16/2017   Number of Personal Factors/Comorbidities that affect the Plan of Care:  (Given chronicity of low back pain) 1-2: MODERATE COMPLEXITY   EXAMINATION:   Patient denies any LE paresthesia. Patient denies any increase of symptoms with cough, sneeze or valsalva. Patient denies any saddle paresthesia or bowel/bladder deficits. Observation/Orthostatic Postural Assessment:          In standing: minimally increased lumbar lordosis, increased skin fold noted above L ilium, L iliac crest higher than R, mild trendelenburg during stance on L LE. Palpation:          Minimal (from moderate) tenderness to palpation of L gluteal, piriformis, and hip musculature with moderate tightness noted. Minimal tenderness R gluteal and piriformis with moderate tightness noted. Minimal tenderness over lumbar spine and paraspinals. ROM:  Patient reported lumbar extension more uncomfortable than flexion  AROM (degrees)   Lumbar Flexion 50 (from 40)   Lumbar Extension 5   Moderate tightness noted in L hamstring muscle. Moderate tightness noted in bilateral piriformis muscles. Strength:  Mildly increased low back pain reported with resisted L hip flexion   Motion Tested Left   (*/5) Right  (*/5)   Hip Flexion 4+ (from 4) 5   Hip Abduction 4 4+   Knee Extension 5 4   Ankle Dorsiflexion 5 4   Gross core strength 3/5 as observed with transfers and activation of transverse abdominus. Special Tests:          Neural tension tests: Passive straight leg raise (SLR) test is negative. Crossed SLR test is negative. Slump test is negative. SRINIVAS: positive for pain on L        Thigh thrust: negative bilaterally        Single leg long axis distraction: negative for change in low back pain        Lumbar traction: negative for change in low back pain        Bilateral knee valgus with sit to stand transfer. Trendelenburg: positive on L     Passive Accessory Motion: None assessed today. Will continue to assess as needed.     Neurological Screen: Myotomes: Key muscle strength testing through bilateral LE is WNL. Dermatomes: Sensation to light touch for bilateral LE is intact from L2 to S2, some diminished sensation reported at L1 on L. Reflexes: Patellar (L3/ L4): 2+ bilaterally (L LE required multiple attempts)                Achilles (S1/ S2): 2+ bilaterally   Functional Mobility:         Gait/Ambulation:  Mild antalgic pattern, mild trendelenburg on L, minimal bilateral genu valgus, mildly slow tamiko. Transfers:  Minimal difficulty with sit to stand transfers with minimal (from minimal to moderate) use of bilateral UE. Balance:          Sitting and standing balance grossly intact. Body Structures Involved:  1. Bones  2. Joints  3. Muscles Body Functions Affected:  1. Sensory/Pain  2. Neuromusculoskeletal  3. Movement Related Activities and Participation Affected:  1. Mobility  2. Community, Social and [place]   Number of elements (examined above) that affect the Plan of Care:  (Chronicity of pain may affect plan of care, but patient with good progress with prior therapy.) 1-2: LOW COMPLEXITY   CLINICAL PRESENTATION:   Presentation: Evolving clinical presentation with changing clinical characteristics: MODERATE COMPLEXITY   CLINICAL DECISION MAKING:   Outcome Measure: Tool Used: Modified Oswestry Low Back Pain Questionnaire  Score:  Initial: 18/50  Most Recent: 21/50 (Date: 3/6/17 )   Interpretation of Score: Each section is scored on a 0-5 scale, 5 representing the greatest disability. The scores of each section are added together for a total score of 50.     Score 0 1-10 11-20 21-30 31-40 41-49 50   Modifier CH CI CJ CK CL CM CN     Medical Necessity:   · Patient is expected to demonstrate progress in strength, range of motion, balance, coordination and functional technique to increase independence with ambulation, transfers, and work related tasks and improve safety during lifting, carrying, tranfers, ambulation, travel, and returning to work duties. · Skilled intervention continues to be required due to increased low back pain hindering return to prior functional level including work. Reason for Services/Other Comments:  · Patient continues to require skilled intervention due to increased low back pain hindering overall activity tolerance, daily tasks, and return to prior funcitonal level including work/ job duties. Use of outcome tool(s) and clinical judgement create a POC that gives a:  (Due to chronicity of pain and reports of constant pain at this time. Patient has not yet returned to functional tasks which will be required for work which leaves patients predicted progress questionable.) Questionable prediction of patient's progress: MODERATE COMPLEXITY            TREATMENT:   (In addition to Assessment/Re-Assessment sessions the following treatments were rendered)  Pre-treatment Symptoms/Complaints: Patient reports constant pain in her mid back on the left side. States it gets worse at times, but never really gets any lower than 5/10. She states its not back enough to need to take pain medication, but she will take an Advill if it gets worse. Pain: Initial:   Pain Intensity 1: 5  Pain Location 1: Back  Pain Orientation 1: Lower  Post Session: Patient with mild increased pain to 6/10. Therapeutic Exercise: (25 Minutes):  Exercises per grid below to improve mobility, strength, balance and coordination. Required minimal verbal cues to promote proper body alignment, promote proper body posture and promote proper body mechanics. Progressed resistance, range, repetitions and complexity of movement as indicated.    Date:  3/9/17 Date:  3/13/17 Date:  3/16/17   Activity/Exercise Parameters Parameters Parameters   Transverse Abdominus (TA) contraction With all exercises With all activities With all activities   Piriformis stretch --- --- 3 x 30 seconds each   SKTC --- --- 3 x 30 seconds each   Clamshells --- --- ---   NU step Level 6, x 10 minutes Level 6, x 10 minutes Level 7, x 5 minutes   Mini squats With TA, 2 x 10 With TA, 2 x 10 Bamboo behind head, x 10- emphasis on form   Sit to stand With TA, 2 x 12 With TA, 2 x 10 ---   Calf stretch Slant board, 4 x 30 seconds Slant board, 4 x 30 seconds Slant board 4 x 30 sec   Lateral walks Red band at --- Red band at ankles, hallway x 1 each ---   Single leg stance With TA, Airex, green band rows, x 10 each --- ---   Lifting 6 inch step, 10 lbs, from stool carried across clinic then lifting 5 lbs overhead  6 inch step from 8 inch with airex, 10 lbs, x 3 to left and x 3 to right 4 inch step, 10 lbs, from stool carried across clinic then lifting overhead  4 inch step from 8 inch with airex, 10 lbs, x 5 to left and x 5 to right Cable cord, 15 lbs, squat lift with bar held close to body, x 10   Quadratus lumborum stretch --- --- Gentle, supine, x 2 each,  Standing, x 2 each           Time spent with patient reviewing proper muscle recruitment and technique with exercises. Manual Therapy (      20 minutes): Supine leg length and pelvic assessment, muscle energy in right sidelying to correct L anterior innominate, shotgun technique in supine with audible cavitation, level pelvic landmarks and even leg length after treatment. Patient in prone: soft tissue mobilization with deep pressure to bilateral thoracolumbar paraspinals and quadratus lumborum to decrease pain and tightness. Therapeutic Modalities: for pain/ tightness:                 Lumbo-Sacral Spine Heat  Type: Moist pack  Duration : 15 minutes  Patient Position: Supine                             Lumbo-Sacral Spine Electric Stimulation  Type:  Interferential  Placement: bilatereal thoracolumbar paraspinals  Duration : 15 minutes  Patient Position: Supine                                               HEP: As above; handouts given to patient for all exercises. ______________________________________________________________________________________________________    Treatment/Session Assessment:    · Response to Treatment: Patient with decreased tightness reported after manual therapy. She required moderate verbal and tactile cuing for squat form and finding pelvic neutral. Some increased L knee pain with lifting/ squatting today. Plan to progress lifting and continue with body mechanics training next session. · Compliance with Program/Exercises: Compliant most of the time. · Recommendations/Intent for next treatment session: \"Next visit will focus on advancements to more challenging activities\". Progress functional and work related tasks as tolerated. Manual therapy and modalities as needed for pain.     Total Treatment Duration: 55 minutes   PT Patient Time In/Time Out  Time In: 0700  Time Out: 0755    Melania Hitchcock, PT

## 2017-03-20 ENCOUNTER — HOSPITAL ENCOUNTER (OUTPATIENT)
Dept: PHYSICAL THERAPY | Age: 52
Discharge: HOME | End: 2017-03-20
Payer: COMMERCIAL

## 2017-03-20 PROCEDURE — 97110 THERAPEUTIC EXERCISES: CPT

## 2017-03-20 PROCEDURE — 97014 ELECTRIC STIMULATION THERAPY: CPT

## 2017-03-23 ENCOUNTER — HOSPITAL ENCOUNTER (OUTPATIENT)
Dept: PHYSICAL THERAPY | Age: 52
Discharge: HOME | End: 2017-03-23
Payer: COMMERCIAL

## 2017-03-23 PROCEDURE — 97032 APPL MODALITY 1+ESTIM EA 15: CPT

## 2017-03-23 PROCEDURE — 97110 THERAPEUTIC EXERCISES: CPT

## 2017-03-23 NOTE — PROGRESS NOTES
Cezar Gary  : [facility] [address], [place], [address] [place] [address]  [phone]   [phone]        OUTPATIENT PHYSICAL THERAPY:Daily Note 3/23/2017    ICD-10: Treatment Diagnosis: low back pain (M54.5)  Precautions/Allergies:   Review of patient's allergies indicates no known allergies. Fall Risk Score: 1 (? 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 Tommy Matos is a 46 y.o. female presenting to physical therapy with complaints of low back pain which began 16 after transferring a nursing home patient from the bed to a wheelchair. Patient was seen in our clinic at the end of  and is returning with referral from her pain management doctors. She reports feeling much better than she did before, but having constant low back pain. She states her pain is 5/10 with minimal to no change based on positioning or activity level. Patient does have some limitations with dressing, bathing, driving, lifting, carrying, vacuuming, sitting in the car, walking long distances, and sleeping through the night without waking due to low back pain. Patient is out of work (CNA) but would like to be able to return once her pain is controlled and she can perform the duties required of her. Patient presents with increased pain, decreased strength, decreased ROM, decreased flexibility,impaired transfer ability, decreased activity tolerance, and overall impaired functional mobility.  Patient is a good candidate for skilled physical therapy interventions to include manual therapy, therapeutic exercise, balance training, gait training, transfer training, postural re-education, body mechanics training, and pain modalities as needed. PROGRESS NOTE 3/6/17: Patient has been seen for 6 sessions of physical therapy from 2/9/17 to 3/6/17. She reports feeling 40% better with no leg pain, improving functional mobility, being able to lift more, and focusing on keeping tummy tight and using proper body mechanics. She states she needs to continue progressing her lifting activities, including different heights and weights, and increased endurance with activities. She continues to report a constant 5/10 pain, even at rest, but states she feels as though she is getting used to it. Patient has met some of her goals set at initial visit and is progressing with the rest. She should benefit from continued skilled therapy to address remaining goals and deficits. PROBLEM LIST (Impacting functional limitations):  1. Decreased Strength  2. Decreased ADL/Functional Activities  3. Decreased Transfer Abilities  4. Decreased Ambulation Ability/Technique  5. Increased Pain  6. Decreased Activity Tolerance  7. Decreased Pacing Skills  8. Decreased Work Simplification/Energy Conservation Techniques  9. Decreased Flexibility/Joint Mobility INTERVENTIONS PLANNED:  1. Balance Exercise  2. Cold  3. Electrical Stimulation  4. Gait Training  5. Heat  6. Home Exercise Program (HEP)  7. Manual Therapy  8. Neuromuscular Re-education/Strengthening  9. Range of Motion (ROM)  10. Therapeutic Activites  11. Therapeutic Exercise/Strengthening  12. Transfer Training  13. Ultrasound (US)   TREATMENT PLAN:  Effective Dates: 2/9/17 to 3/27/17. Frequency/Duration: 2 times a week for 6 weeks (12 authorized sessions)  GOALS: (Goals have been discussed and agreed upon with patient.)  Short-Term Functional Goals: Time Frame: 2/9/17 to 3/3/17  1.  Patient will be independent with HEP to improve core stability, LE flexibility, and overall functional mobility. -GOAL MET  2. Patient will report no more than 4/10 low back pain at rest in order to demonstrate improved self pain control and tolerance. -ONGOING  3. Patient will be educated in and demonstrate proper squat lift technique in order to improve safety with lifting activities and reduce risk of future injuries. -GOAL MET  Discharge Goals: Time Frame: 2/9/17 to 3/27/17  1. Patient will improve gross core strength to 4/5 in order to improve lumbar stability for work related tasks. -ONGOING  2. Patient will be able to stand or walk with minimal rest breaks for 3 hours with minimal to no increase in low back pain in order to demonstrate improved activity tolerance. -ONGOING  3. Patient will be able to sleep through the night without waking due to low back pain in order to return to prior sleeping pattern for overall health and wellness. -ONGOING (reports an average of 2 times a night on 3/6/17)  4. Patient will be able to do housework, including vacuuming, with no increased low back pain in order to demonstrate return to prior level of function. -ONGOING  5. Patient will be able to lift and transfer 20 lbs with good form and no complaints of low back pain in order to improve safety with return to work activities. -ONGOING  6. Patient will improve Modified Oswestry Scale score to 10/50 from 18/50. -ONGOING (scored 21/50 on 3/6/17)  Rehabilitation Potential For Stated Goals: Good  Regarding Eleuterio Campos Richard's therapy, I certify that the treatment plan above will be carried out by a therapist or under their direction. Thank you for this referral,  Bre Brown PT     Referring Physician Signature: Mansoor Christine MD              Date                    The information in this section was collected on 3/6/17 (from 2/9/17) (except where otherwise noted).   HISTORY:   History of Present Injury/Illness (Reason for Referral):  Ms. Spring López is a 46 y.o. female presenting to physical therapy with complaints of low back pain which began 8/16/16 after transferring a nursing home patient from the bed to a wheelchair. Patient was seen in our clinic at the end of 2016 and is returning with referral from her pain management doctors. She reports feeling much better than she did before, but having constant low back pain. She states her pain is 5/10 with minimal to no change based on positioning or activity level. Patient does have some limitations with dressing, bathing, driving, lifting, carrying, vacuuming, sitting in the car, walking long distances, and sleeping through the night without waking due to low back pain. Patient is out of work (CNA) but would like to be able to return once her pain is controlled and she can perform the duties required of her. Patient presents with increased pain, decreased strength, decreased ROM, decreased flexibility,impaired transfer ability, decreased activity tolerance, and overall impaired functional mobility. Past Medical History/Comorbidities:   Ms. Pate  has no past medical history on file. Ms. Pate  has no past surgical history on file. She reports lumbar spine MRI indicating arthritis and disc bulge. Social History/Living Environment:     Not currently working. Patient is a CNA at [employer]. Prior Level of Function/Work/Activity:  Patient is independent with ADLs, self care, transfers, and ambulation, but reports needing additional time or modifications due to low back pain. Dominant Side:         RIGHT  Personal Factors:          Sex:  female        Age:  46 y.o. Current Medications:       Current Outpatient Prescriptions:     cyclobenzaprine (FLEXERIL) 5 mg tablet, Take 2 Tabs by mouth three (3) times daily as needed for Muscle Spasm(s). , Disp: 30 Tab, Rfl: 0    lidocaine HCl (ASPERCREME, LIDOCAINE,) 4 % crea, 1 Dose by Apply Externally route three (3) times daily as needed. , Disp: 1 Tube, Rfl: 0   Date Last Reviewed:  3/23/2017   Number of Personal Factors/Comorbidities that affect the Plan of Care:  (Given chronicity of low back pain) 1-2: MODERATE COMPLEXITY   EXAMINATION:   Patient denies any LE paresthesia. Patient denies any increase of symptoms with cough, sneeze or valsalva. Patient denies any saddle paresthesia or bowel/bladder deficits. Observation/Orthostatic Postural Assessment:          In standing: minimally increased lumbar lordosis, increased skin fold noted above L ilium, L iliac crest higher than R, mild trendelenburg during stance on L LE. Palpation:          Minimal (from moderate) tenderness to palpation of L gluteal, piriformis, and hip musculature with moderate tightness noted. Minimal tenderness R gluteal and piriformis with moderate tightness noted. Minimal tenderness over lumbar spine and paraspinals. ROM:  Patient reported lumbar extension more uncomfortable than flexion  AROM (degrees)   Lumbar Flexion 50 (from 40)   Lumbar Extension 5   Moderate tightness noted in L hamstring muscle. Moderate tightness noted in bilateral piriformis muscles. Strength:  Mildly increased low back pain reported with resisted L hip flexion   Motion Tested Left   (*/5) Right  (*/5)   Hip Flexion 4+ (from 4) 5   Hip Abduction 4 4+   Knee Extension 5 4   Ankle Dorsiflexion 5 4   Gross core strength 3/5 as observed with transfers and activation of transverse abdominus. Special Tests:          Neural tension tests: Passive straight leg raise (SLR) test is negative. Crossed SLR test is negative. Slump test is negative. SRINIVAS: positive for pain on L        Thigh thrust: negative bilaterally        Single leg long axis distraction: negative for change in low back pain        Lumbar traction: negative for change in low back pain        Bilateral knee valgus with sit to stand transfer. Trendelenburg: positive on L     Passive Accessory Motion: None assessed today. Will continue to assess as needed.     Neurological Screen: Myotomes: Key muscle strength testing through bilateral LE is WNL. Dermatomes: Sensation to light touch for bilateral LE is intact from L2 to S2, some diminished sensation reported at L1 on L. Reflexes: Patellar (L3/ L4): 2+ bilaterally (L LE required multiple attempts)                Achilles (S1/ S2): 2+ bilaterally   Functional Mobility:         Gait/Ambulation:  Mild antalgic pattern, mild trendelenburg on L, minimal bilateral genu valgus, mildly slow tamiko. Transfers:  Minimal difficulty with sit to stand transfers with minimal (from minimal to moderate) use of bilateral UE. Balance:          Sitting and standing balance grossly intact. Body Structures Involved:  1. Bones  2. Joints  3. Muscles Body Functions Affected:  1. Sensory/Pain  2. Neuromusculoskeletal  3. Movement Related Activities and Participation Affected:  1. Mobility  2. Community, Social and [place]   Number of elements (examined above) that affect the Plan of Care:  (Chronicity of pain may affect plan of care, but patient with good progress with prior therapy.) 1-2: LOW COMPLEXITY   CLINICAL PRESENTATION:   Presentation: Evolving clinical presentation with changing clinical characteristics: MODERATE COMPLEXITY   CLINICAL DECISION MAKING:   Outcome Measure: Tool Used: Modified Oswestry Low Back Pain Questionnaire  Score:  Initial: 18/50  Most Recent: 21/50 (Date: 3/6/17 )   Interpretation of Score: Each section is scored on a 0-5 scale, 5 representing the greatest disability. The scores of each section are added together for a total score of 50.     Score 0 1-10 11-20 21-30 31-40 41-49 50   Modifier CH CI CJ CK CL CM CN     Medical Necessity:   · Patient is expected to demonstrate progress in strength, range of motion, balance, coordination and functional technique to increase independence with ambulation, transfers, and work related tasks and improve safety during lifting, carrying, tranfers, ambulation, travel, and returning to work duties. · Skilled intervention continues to be required due to increased low back pain hindering return to prior functional level including work. Reason for Services/Other Comments:  · Patient continues to require skilled intervention due to increased low back pain hindering overall activity tolerance, daily tasks, and return to prior funcitonal level including work/ job duties. Use of outcome tool(s) and clinical judgement create a POC that gives a:  (Due to chronicity of pain and reports of constant pain at this time. Patient has not yet returned to functional tasks which will be required for work which leaves patients predicted progress questionable.) Questionable prediction of patient's progress: MODERATE COMPLEXITY            TREATMENT:   (In addition to Assessment/Re-Assessment sessions the following treatments were rendered)  Pre-treatment Symptoms/Complaints: Patient reports feeling good this morning with a little tightness through her low back. Pain: Initial:   Pain Intensity 1: 5  Pain Location 1: Back  Pain Orientation 1: Lower  Post Session: Patient with mildly increased pain to 6/10 with lifting activities. Therapeutic Exercise: (40 Minutes):  Exercises per grid below to improve mobility, strength, balance and coordination. Required minimal verbal cues to promote proper body alignment, promote proper body posture and promote proper body mechanics. Progressed resistance, range, repetitions and complexity of movement as indicated.    Date:  3/20/17 Date:  3/23/17 Date:  3/16/17   Activity/Exercise Parameters Parameters Parameters   Transverse Abdominus (TA) contraction With all exercises With all activities With all activities   Piriformis stretch --- --- 3 x 30 seconds each   SKTC --- --- 3 x 30 seconds each   Clamshells --- --- ---   NU step Level 7, x 10 minutes Level 7, x 10 minutes Level 7, x 5 minutes   Mini squats With TA, 2 x 10 With TA, 2 x 10 Bamboo behind head, x 10- emphasis on form   Sit to stand With TA, 2 x 15 With TA, 2 x 10 ---   Calf stretch Slant board, 4 x 30 seconds Slant board, 4 x 30 seconds Slant board 4 x 30 sec   Lateral walks --- Red band at ankles, hallway x 1 each ---   Single leg stance --- --- ---   Lifting 6 inch step, 10 lbs, from stool carried across clinic then lifting 5 lbs overhead  6 inch step from pink stool with 10 lbs, x 5 to left and x 5 to right 6 inch step, 14 lbs, from table across room to chair x 10    Cable cord, 20 lbs, squat lift with bar held close to body, x 10 Cable cord, 15 lbs, squat lift with bar held close to body, x 10   Quadratus lumborum stretch --- --- Gentle, supine, x 2 each,  Standing, x 2 each           Time spent with patient reviewing proper muscle recruitment and technique with exercises. Manual Therapy (      ): none today    Therapeutic Modalities: for pain/ tightness:                 Lumbo-Sacral Spine Heat  Type: Moist pack  Duration : 15 minutes  Patient Position: Supine                             Lumbo-Sacral Spine Electric Stimulation  Type: Interferential  Placement: bilateral thoracolumbar paraspinals  Duration : 15 minutes  Patient Position: Supine                                               HEP: As above; handouts given to patient for all exercises. ______________________________________________________________________________________________________    Treatment/Session Assessment:    · Response to Treatment: Patient tolerated increase lifting activities and weights with good technique. She has improved with squat lift technique, but continues to require some cuing. Re-certification next session. · Compliance with Program/Exercises: Compliant most of the time. · Recommendations/Intent for next treatment session: \"Next visit will focus on advancements to more challenging activities\". Progress functional and work related tasks as tolerated. Manual therapy and modalities as needed for pain.     Total Treatment Duration: 55 minutes   PT Patient Time In/Time Out  Time In: 0700  Time Out: 0755    Tiffanie Hastings, PT

## 2017-03-27 ENCOUNTER — HOSPITAL ENCOUNTER (OUTPATIENT)
Dept: PHYSICAL THERAPY | Age: 52
Discharge: HOME | End: 2017-03-27
Payer: COMMERCIAL

## 2017-03-27 PROCEDURE — 97014 ELECTRIC STIMULATION THERAPY: CPT

## 2017-03-27 PROCEDURE — 97110 THERAPEUTIC EXERCISES: CPT

## 2017-03-27 NOTE — PROGRESS NOTES
Rocío Armand  : [facility] [address], [place], [address] [place] [address]  [phone]   [phone]        OUTPATIENT PHYSICAL THERAPY:Daily Note and 2 week hold 3/27/2017    ICD-10: Treatment Diagnosis: low back pain (M54.5)  Precautions/Allergies:   Review of patient's allergies indicates no known allergies. Fall Risk Score: 1 (? 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: 3/31/17     INITIAL ASSESSMENT:  Ms. Pate is a 46 y.o. female presenting to physical therapy with complaints of low back pain which began 16 after transferring a nursing home patient from the bed to a wheelchair. Patient was seen in our clinic at the end of  and is returning with referral from her pain management doctors. She reports feeling much better than she did before, but having constant low back pain. She states her pain is 5/10 with minimal to no change based on positioning or activity level. Patient does have some limitations with dressing, bathing, driving, lifting, carrying, vacuuming, sitting in the car, walking long distances, and sleeping through the night without waking due to low back pain. Patient is out of work (CNA) but would like to be able to return once her pain is controlled and she can perform the duties required of her. Patient presents with increased pain, decreased strength, decreased ROM, decreased flexibility,impaired transfer ability, decreased activity tolerance, and overall impaired functional mobility.  Patient is a good candidate for skilled physical therapy interventions to include manual therapy, therapeutic exercise, balance training, gait training, transfer training, postural re-education, body mechanics training, and pain modalities as needed. PROGRESS NOTE 3/6/17: Patient has been seen for 12 sessions of physical therapy from 2/9/17 to 3/27/17. She reports feeling 80% better with no leg pain, improving functional mobility, being able to lift more, focusing on keeping tummy tight and using proper body mechanics, and being able to sit, stand, walk, and perform activities for longer periods of time. She states she needs to continue progressing her lifting activities and bending mechanics. She continues to report a constant 5/10 baseline pain, but states she feels as though she is getting used to it. Patient seeing MD on Friday with potential for injections and/ or continuing therapy. Will wait for MD recommendation. PROBLEM LIST (Impacting functional limitations):  1. Decreased Strength  2. Decreased ADL/Functional Activities  3. Decreased Transfer Abilities  4. Decreased Ambulation Ability/Technique  5. Increased Pain  6. Decreased Activity Tolerance  7. Decreased Pacing Skills  8. Decreased Work Simplification/Energy Conservation Techniques  9. Decreased Flexibility/Joint Mobility INTERVENTIONS PLANNED:  1. Balance Exercise  2. Cold  3. Electrical Stimulation  4. Gait Training  5. Heat  6. Home Exercise Program (HEP)  7. Manual Therapy  8. Neuromuscular Re-education/Strengthening  9. Range of Motion (ROM)  10. Therapeutic Activites  11. Therapeutic Exercise/Strengthening  12. Transfer Training  13. Ultrasound (US)   TREATMENT PLAN:  Effective Dates: 2/9/17 to 3/27/17. Frequency/Duration: Pending MD appointment and additional approved visit. GOALS: (Goals have been discussed and agreed upon with patient.)  Short-Term Functional Goals: Time Frame: 2/9/17 to 3/3/17  1. Patient will be independent with HEP to improve core stability, LE flexibility, and overall functional mobility.  -GOAL MET  2. Patient will report no more than 4/10 low back pain at rest in order to demonstrate improved self pain control and tolerance. -ONGOING  3. Patient will be educated in and demonstrate proper squat lift technique in order to improve safety with lifting activities and reduce risk of future injuries. -GOAL MET  Discharge Goals: Time Frame: 2/9/17 to 3/27/17  1. Patient will improve gross core strength to 4/5 in order to improve lumbar stability for work related tasks. -GOAL MET  2. Patient will be able to stand or walk with minimal rest breaks for 3 hours with minimal to no increase in low back pain in order to demonstrate improved activity tolerance. -ONGOING  3. Patient will be able to sleep through the night without waking due to low back pain in order to return to prior sleeping pattern for overall health and wellness. -ONGOING (reports an average of 2 times a night to reposition on 3/27/17)  4. Patient will be able to do housework, including vacuuming, with no increased low back pain in order to demonstrate return to prior level of function. -ONGOING  5. Patient will be able to lift and transfer 20 lbs with good form and no complaints of low back pain in order to improve safety with return to work activities. -ONGOING  6. Patient will improve Modified Oswestry Scale score to 10/50 from 18/50. -ONGOING (scored 17/50 on 3/27/17)  Rehabilitation Potential For Stated Goals: Good  Regarding Rustam Richard's therapy, I certify that the treatment plan above will be carried out by a therapist or under their direction. Thank you for this referral,  Shannon Acosta PT     Referring Physician Signature: Petrona Kirkpatrick MD              Date                    The information in this section was collected on 3/27/17 (from 2/9/17) (except where otherwise noted).   HISTORY:   History of Present Injury/Illness (Reason for Referral):  Ms. Eleanor Regan is a 46 y.o. female presenting to physical therapy with complaints of low back pain which began 8/16/16 after transferring a nursing home patient from the bed to a wheelchair. Patient was seen in our clinic at the end of 2016 and is returning with referral from her pain management doctors. She reports feeling much better than she did before, but having constant low back pain. She states her pain is 5/10 with minimal to no change based on positioning or activity level. Patient does have some limitations with dressing, bathing, driving, lifting, carrying, vacuuming, sitting in the car, walking long distances, and sleeping through the night without waking due to low back pain. Patient is out of work (CNA) but would like to be able to return once her pain is controlled and she can perform the duties required of her. Patient presents with increased pain, decreased strength, decreased ROM, decreased flexibility,impaired transfer ability, decreased activity tolerance, and overall impaired functional mobility. Past Medical History/Comorbidities:   Ms. Joseph Romero  has no past medical history on file. Ms. Joseph Romero  has no past surgical history on file. She reports lumbar spine MRI indicating arthritis and disc bulge. Social History/Living Environment:     Not currently working. Patient is a CNA at [employer]. Prior Level of Function/Work/Activity:  Patient is independent with ADLs, self care, transfers, and ambulation, but reports needing additional time or modifications due to low back pain. Dominant Side:         RIGHT  Personal Factors:          Sex:  female        Age:  46 y.o. Current Medications:       Current Outpatient Prescriptions:     cyclobenzaprine (FLEXERIL) 5 mg tablet, Take 2 Tabs by mouth three (3) times daily as needed for Muscle Spasm(s). , Disp: 30 Tab, Rfl: 0    lidocaine HCl (ASPERCREME, LIDOCAINE,) 4 % crea, 1 Dose by Apply Externally route three (3) times daily as needed. , Disp: 1 Tube, Rfl: 0   Date Last Reviewed:  3/27/2017   Number of Personal Factors/Comorbidities that affect the Plan of Care:  (Given chronicity of low back pain) 1-2: MODERATE COMPLEXITY   EXAMINATION:   Patient denies any LE paresthesia. Patient denies any increase of symptoms with cough, sneeze or valsalva. Patient denies any saddle paresthesia or bowel/bladder deficits. Observation/Orthostatic Postural Assessment:          In standing: minimally increased lumbar lordosis, increased skin fold noted above L ilium, L iliac crest higher than R, mild trendelenburg during stance on L LE. Palpation:          Minimal (from moderate) tenderness to palpation of L gluteal, piriformis, and hip musculature with moderate tightness noted. Minimal tenderness R gluteal and piriformis with moderate tightness noted. Minimal tenderness over lumbar spine and paraspinals. ROM:  Patient reported lumbar extension more uncomfortable than flexion   AROM (degrees)   Lumbar Flexion 50 (from 40)   Lumbar Extension 5   Moderate tightness noted in L hamstring muscle. Moderate tightness noted in bilateral piriformis muscles. Strength:  Mildly increased low back pain reported with resisted L hip flexion   Motion Tested Left   (*/5) Right  (*/5)   Hip Flexion 4+ (from 4) 5   Hip Abduction 4 4+   Knee Extension 5 4   Ankle Dorsiflexion 5 4   Gross core strength 3/5 as observed with transfers and activation of transverse abdominus. Special Tests:          Neural tension tests: Passive straight leg raise (SLR) test is negative. Crossed SLR test is negative. Slump test is negative. SRINIVAS: positive for pain on L        Thigh thrust: negative bilaterally        Single leg long axis distraction: negative for change in low back pain        Lumbar traction: negative for change in low back pain        Bilateral knee valgus with sit to stand transfer. Trendelenburg: positive on L     Passive Accessory Motion: None assessed today. Will continue to assess as needed.     Neurological Screen:              Myotomes: Key muscle strength testing through bilateral LE is WNL. Dermatomes: Sensation to light touch for bilateral LE is intact from L2 to S2, some diminished sensation reported at L1 on L. Reflexes: Patellar (L3/ L4): 2+ bilaterally (L LE required multiple attempts)                Achilles (S1/ S2): 2+ bilaterally   Functional Mobility:         Gait/Ambulation:  Mild antalgic pattern, mild trendelenburg on L, minimal bilateral genu valgus, mildly slow tamiko. Transfers:  Minimal difficulty with sit to stand transfers with minimal (from minimal to moderate) use of bilateral UE. Balance:          Sitting and standing balance grossly intact. Body Structures Involved:  1. Bones  2. Joints  3. Muscles Body Functions Affected:  1. Sensory/Pain  2. Neuromusculoskeletal  3. Movement Related Activities and Participation Affected:  1. Mobility  2. Community, Social and [place]   Number of elements (examined above) that affect the Plan of Care:  (Chronicity of pain may affect plan of care, but patient with good progress with prior therapy.) 1-2: LOW COMPLEXITY   CLINICAL PRESENTATION:   Presentation: Evolving clinical presentation with changing clinical characteristics: MODERATE COMPLEXITY   CLINICAL DECISION MAKING:   Outcome Measure: Tool Used: Modified Oswestry Low Back Pain Questionnaire  Score:  Initial: 18/50  Most Recent: 21/50 (Date: 3/6/17 )                       17/50 (Date: 3/27/17)   Interpretation of Score: Each section is scored on a 0-5 scale, 5 representing the greatest disability. The scores of each section are added together for a total score of 50.     Score 0 1-10 11-20 21-30 31-40 41-49 50   Modifier CH CI CJ CK CL CM CN     Medical Necessity:   · Patient is expected to demonstrate progress in strength, range of motion, balance, coordination and functional technique to increase independence with ambulation, transfers, and work related tasks and improve safety during lifting, carrying, tranfers, ambulation, travel, and returning to work duties. · Skilled intervention continues to be required due to increased low back pain hindering return to prior functional level including work. Reason for Services/Other Comments:  · Patient continues to require skilled intervention due to increased low back pain hindering overall activity tolerance, daily tasks, and return to prior funcitonal level including work/ job duties. Use of outcome tool(s) and clinical judgement create a POC that gives a:  (Due to chronicity of pain and reports of constant pain at this time. Patient has not yet returned to functional tasks which will be required for work which leaves patients predicted progress questionable.) Questionable prediction of patient's progress: MODERATE COMPLEXITY            TREATMENT:   (In addition to Assessment/Re-Assessment sessions the following treatments were rendered)  Pre-treatment Symptoms/Complaints: Patient reports average pain this morning. States she was able to ride to and from [place] yesterday without a problem. She did report some increased pain and fatigue with standing for an extended time on Saturday. Patient sees MD this Friday. Pain: Initial:   Pain Intensity 1: 5  Pain Location 1: Back  Pain Orientation 1: Lower  Post Session: Patient with 5/10 pain. Therapeutic Exercise: (40 Minutes):  Exercises per grid below to improve mobility, strength, balance and coordination. Required minimal verbal cues to promote proper body alignment, promote proper body posture and promote proper body mechanics. Progressed resistance, range, repetitions and complexity of movement as indicated.    Date:  3/20/17 Date:  3/23/17 Date:  3/27/17   Activity/Exercise Parameters Parameters Parameters   Transverse Abdominus (TA) contraction With all exercises With all activities With all activities   NU step Level 7, x 10 minutes Level 7, x 10 minutes Level 7, x 10 minutes Mini squats With TA, 2 x 10 With TA, 2 x 10 With TA, 2 x 10   Sit to stand With TA, 2 x 15 With TA, 2 x 10 With TA, 2 x 10   Calf stretch Slant board, 4 x 30 seconds Slant board, 4 x 30 seconds Slant board 4 x 30 seconds   Lateral walks --- Red band at ankles, hallway x 1 each ---   Single leg stance --- --- ---   Lifting 6 inch step, 10 lbs, from stool carried across clinic then lifting 5 lbs overhead  6 inch step from pink stool with 10 lbs, x 5 to left and x 5 to right 6 inch step, 14 lbs, from table across room to chair x 10    Cable cord, 20 lbs, squat lift with bar held close to body, x 10 Cable cord, 17.5 lbs, squat lift with bar held close to body, x 15  6 inch step, 15 lbs, from stool carried across clinic then lifting overhead- 10 lbs x 1, 12 lbs x 1, 15 lbs x 1           Time spent with patient reviewing proper muscle recruitment and technique with exercises. Manual Therapy (     ): manual strength and ROM measurements    Therapeutic Modalities: for pain/ tightness:                 Lumbo-Sacral Spine Heat  Type: Moist pack  Duration : 15 minutes  Patient Position: Supine                             Lumbo-Sacral Spine Electric Stimulation  Type: Interferential  Placement: bilateral thoracolumbar paraspinals  Duration : 15 minutes  Patient Position: Supine                                               HEP: As above; handouts given to patient for all exercises. ______________________________________________________________________________________________________    Treatment/Session Assessment:    · Response to Treatment: Patient with mild tightness with lifting activities and reported 15lbs feeling heavy when walking and carrying weight. Patient to see MD Friday and will await their recommendation regarding continued therapy. · Compliance with Program/Exercises: Compliant most of the time. · Recommendations/Intent for next treatment session:  \"Next visit will focus on advancements to more challenging activities\". Progress functional and work related tasks as tolerated. Manual therapy and modalities as needed for pain.     Total Treatment Duration: 55 minutes   PT Patient Time In/Time Out  Time In: 0700  Time Out: 0755    Bishop Torres PT

## 2017-05-09 NOTE — PROGRESS NOTES
Sergio Blount  : [facility] [address], [place], [address] [place] [address]  [phone]   [phone]        OUTPATIENT PHYSICAL THERAPY:Discontinuation Summary 2017    ICD-10: Treatment Diagnosis: low back pain (M54.5)  Precautions/Allergies:   Review of patient's allergies indicates no known allergies. Fall Risk Score: 1 (? 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 Mona Gifford is a 46 y.o. female presenting to physical therapy with complaints of low back pain which began 16 after transferring a nursing home patient from the bed to a wheelchair. Patient was seen in our clinic at the end of  and is returning with referral from her pain management doctors. She reports feeling much better than she did before, but having constant low back pain. She states her pain is 5/10 with minimal to no change based on positioning or activity level. Patient does have some limitations with dressing, bathing, driving, lifting, carrying, vacuuming, sitting in the car, walking long distances, and sleeping through the night without waking due to low back pain. Patient is out of work (CNA) but would like to be able to return once her pain is controlled and she can perform the duties required of her. Patient presents with increased pain, decreased strength, decreased ROM, decreased flexibility,impaired transfer ability, decreased activity tolerance, and overall impaired functional mobility.  Patient is a good candidate for skilled physical therapy interventions to include manual therapy, therapeutic exercise, balance training, gait training, transfer training, postural re-education, body mechanics training, and pain modalities as needed. DISCONTINUATION 5/9/17: Patient was seen for 12 sessions of physical therapy from 2/9/17 to 3/27/17. She reported feeling 80% better with no leg pain, improving functional mobility, being able to lift more, focusing on keeping tummy tight and using proper body mechanics, and being able to sit, stand, walk, and perform activities for longer periods of time. She stated she needed to continue progressing her lifting activities and bending mechanics. She continued to report a constant 5/10 baseline pain, but stated she felt as though she is getting used to it. Patient to see MD and call for additional appointments. Patient did not call to schedule and no additional script was received from MD. Patient is discharge to home program at this time. PROBLEM LIST (Impacting functional limitations):  1. Decreased Strength  2. Decreased ADL/Functional Activities  3. Decreased Transfer Abilities  4. Decreased Ambulation Ability/Technique  5. Increased Pain  6. Decreased Activity Tolerance  7. Decreased Pacing Skills  8. Decreased Work Simplification/Energy Conservation Techniques  9. Decreased Flexibility/Joint Mobility INTERVENTIONS PLANNED:  1. Balance Exercise  2. Cold  3. Electrical Stimulation  4. Gait Training  5. Heat  6. Home Exercise Program (HEP)  7. Manual Therapy  8. Neuromuscular Re-education/Strengthening  9. Range of Motion (ROM)  10. Therapeutic Activites  11. Therapeutic Exercise/Strengthening  12. Transfer Training  13. Ultrasound (US)   TREATMENT PLAN:  Effective Dates: 2/9/17 to 3/27/17. Frequency/Duration: Patient was seen for 12 sessions of physical therapy from 2/9/17 to 3/27/17.  She reported feeling 80% better with no leg pain, improving functional mobility, being able to lift more, focusing on keeping tummy tight and using proper body mechanics, and being able to sit, stand, walk, and perform activities for longer periods of time. She stated she needed to continue progressing her lifting activities and bending mechanics. She continued to report a constant 5/10 baseline pain, but stated she felt as though she is getting used to it. Patient to see MD and call for additional appointments. Patient did not call to schedule and no additional script was received from MD. Patient is discharge to home program at this time. GOALS: (Goals have been discussed and agreed upon with patient.)  Short-Term Functional Goals: Time Frame: 2/9/17 to 3/3/17  1. Patient will be independent with HEP to improve core stability, LE flexibility, and overall functional mobility. -GOAL MET  2. Patient will report no more than 4/10 low back pain at rest in order to demonstrate improved self pain control and tolerance. -GOAL NOT MET  3. Patient will be educated in and demonstrate proper squat lift technique in order to improve safety with lifting activities and reduce risk of future injuries. -GOAL MET  Discharge Goals: Time Frame: 2/9/17 to 3/27/17  1. Patient will improve gross core strength to 4/5 in order to improve lumbar stability for work related tasks. -GOAL MET  2. Patient will be able to stand or walk with minimal rest breaks for 3 hours with minimal to no increase in low back pain in order to demonstrate improved activity tolerance. -GOAL NOT MET  3. Patient will be able to sleep through the night without waking due to low back pain in order to return to prior sleeping pattern for overall health and wellness. -GOAL NOT MET (reports an average of 2 times a night to reposition on 3/27/17)  4. Patient will be able to do housework, including vacuuming, with no increased low back pain in order to demonstrate return to prior level of function. -GOAL NOT MET  5.  Patient will be able to lift and transfer 20 lbs with good form and no complaints of low back pain in order to improve safety with return to work activities. -GOAL NOT MET  6. Patient will improve Modified Oswestry Scale score to 10/50 from 18/50. -GOAL NOT MET (scored 17/50 on 3/27/17)  Rehabilitation Potential For Stated Goals: Good  Regarding June Richard's therapy, I certify that the treatment plan above will be carried out by a therapist or under their direction. Thank you for this referral,  Mahesh Isbell PT     Referring Physician Signature: Bhupendra Blevins MD              Date                    The information in this section was collected on 3/27/17 (from 2/9/17) (except where otherwise noted). HISTORY:   History of Present Injury/Illness (Reason for Referral):  Ms. Pate is a 46 y.o. female presenting to physical therapy with complaints of low back pain which began 8/16/16 after transferring a nursing home patient from the bed to a wheelchair. Patient was seen in our clinic at the end of 2016 and is returning with referral from her pain management doctors. She reports feeling much better than she did before, but having constant low back pain. She states her pain is 5/10 with minimal to no change based on positioning or activity level. Patient does have some limitations with dressing, bathing, driving, lifting, carrying, vacuuming, sitting in the car, walking long distances, and sleeping through the night without waking due to low back pain. Patient is out of work (CNA) but would like to be able to return once her pain is controlled and she can perform the duties required of her. Patient presents with increased pain, decreased strength, decreased ROM, decreased flexibility,impaired transfer ability, decreased activity tolerance, and overall impaired functional mobility. Past Medical History/Comorbidities:   Ms. Pate  has no past medical history on file. Ms. Pate  has no past surgical history on file. She reports lumbar spine MRI indicating arthritis and disc bulge.   Social History/Living Environment:     Not currently working. Patient is a CNA at [employer]. Prior Level of Function/Work/Activity:  Patient is independent with ADLs, self care, transfers, and ambulation, but reports needing additional time or modifications due to low back pain. Dominant Side:         RIGHT  Personal Factors:          Sex:  female        Age:  46 y.o. Current Medications:       Current Outpatient Prescriptions:     cyclobenzaprine (FLEXERIL) 5 mg tablet, Take 2 Tabs by mouth three (3) times daily as needed for Muscle Spasm(s). , Disp: 30 Tab, Rfl: 0    lidocaine HCl (ASPERCREME, LIDOCAINE,) 4 % crea, 1 Dose by Apply Externally route three (3) times daily as needed. , Disp: 1 Tube, Rfl: 0   Date Last Reviewed:  5/9/2017   Number of Personal Factors/Comorbidities that affect the Plan of Care:  (Given chronicity of low back pain) 1-2: MODERATE COMPLEXITY   EXAMINATION:   SUBJECTIVE AND OBJECTIVE DATA FROM LAST ATTENDED VISIT (3/27/17)    Patient denies any LE paresthesia. Patient denies any increase of symptoms with cough, sneeze or valsalva. Patient denies any saddle paresthesia or bowel/bladder deficits. Observation/Orthostatic Postural Assessment:          In standing: minimally increased lumbar lordosis, increased skin fold noted above L ilium, L iliac crest higher than R, mild trendelenburg during stance on L LE. Palpation:          Minimal (from moderate) tenderness to palpation of L gluteal, piriformis, and hip musculature with moderate tightness noted. Minimal tenderness R gluteal and piriformis with moderate tightness noted. Minimal tenderness over lumbar spine and paraspinals. ROM:  Patient reported lumbar extension more uncomfortable than flexion   AROM (degrees)   Lumbar Flexion 50 (from 40)   Lumbar Extension 5   Moderate tightness noted in L hamstring muscle. Moderate tightness noted in bilateral piriformis muscles.     Strength:  Mildly increased low back pain reported with resisted L hip flexion   Motion Tested Left   (*/5) Right  (*/5)   Hip Flexion 4+ (from 4) 5   Hip Abduction 4 4+   Knee Extension 5 4   Ankle Dorsiflexion 5 4   Gross core strength 3/5 as observed with transfers and activation of transverse abdominus. Special Tests:          Neural tension tests: Passive straight leg raise (SLR) test is negative. Crossed SLR test is negative. Slump test is negative. SRINIVAS: positive for pain on L        Thigh thrust: negative bilaterally        Single leg long axis distraction: negative for change in low back pain        Lumbar traction: negative for change in low back pain        Bilateral knee valgus with sit to stand transfer. Trendelenburg: positive on L     Passive Accessory Motion: None assessed today. Will continue to assess as needed. Neurological Screen:              Myotomes: Key muscle strength testing through bilateral LE is WNL. Dermatomes: Sensation to light touch for bilateral LE is intact from L2 to S2, some diminished sensation reported at L1 on L. Reflexes: Patellar (L3/ L4): 2+ bilaterally (L LE required multiple attempts)                Achilles (S1/ S2): 2+ bilaterally   Functional Mobility:         Gait/Ambulation:  Mild antalgic pattern, mild trendelenburg on L, minimal bilateral genu valgus, mildly slow tamiko. Transfers:  Minimal difficulty with sit to stand transfers with minimal (from minimal to moderate) use of bilateral UE. Balance:          Sitting and standing balance grossly intact. Body Structures Involved:  1. Bones  2. Joints  3. Muscles Body Functions Affected:  1. Sensory/Pain  2. Neuromusculoskeletal  3. Movement Related Activities and Participation Affected:  1. Mobility  2.  Community, Social and [place]   Number of elements (examined above) that affect the Plan of Care:  (Chronicity of pain may affect plan of care, but patient with good progress with prior therapy.) 1-2: LOW COMPLEXITY CLINICAL PRESENTATION:   Presentation: Evolving clinical presentation with changing clinical characteristics: MODERATE COMPLEXITY   CLINICAL DECISION MAKING:   Outcome Measure: Tool Used: Modified Oswestry Low Back Pain Questionnaire  Score:  Initial: 18/50  Most Recent: 21/50 (Date: 3/6/17 )                       17/50 (Date: 3/27/17)   Interpretation of Score: Each section is scored on a 0-5 scale, 5 representing the greatest disability. The scores of each section are added together for a total score of 50. Score 0 1-10 11-20 21-30 31-40 41-49 50   Modifier CH CI CJ CK CL CM CN     Reason for Services/Other Comments:  · Patient was seen for 12 sessions of physical therapy from 2/9/17 to 3/27/17. She reported feeling 80% better with no leg pain, improving functional mobility, being able to lift more, focusing on keeping tummy tight and using proper body mechanics, and being able to sit, stand, walk, and perform activities for longer periods of time. She stated she needed to continue progressing her lifting activities and bending mechanics. She continued to report a constant 5/10 baseline pain, but stated she felt as though she is getting used to it. Patient to see MD and call for additional appointments. Patient did not call to schedule and no additional script was received from MD. Patient is discharge to home program at this time. Use of outcome tool(s) and clinical judgement create a POC that gives a:  (Due to chronicity of pain and reports of constant pain at this time. Patient has not yet returned to functional tasks which will be required for work which leaves patients predicted progress questionable.) Questionable prediction of patient's progress: MODERATE COMPLEXITY            TREATMENT:   (In addition to Assessment/Re-Assessment sessions the following treatments were rendered)  Pre-treatment Symptoms/Complaints: Patient reports average pain this morning.  States she was able to ride to and from [place] yesterday without a problem. She did report some increased pain and fatigue with standing for an extended time on Saturday. Patient sees MD this Friday. Pain: Initial:   Pain Intensity 1: 5  Pain Location 1: Back  Pain Orientation 1: Lower  Post Session: Patient with 5/10 pain. Therapeutic Exercise: ():  Exercises per grid below to improve mobility, strength, balance and coordination. Required minimal verbal cues to promote proper body alignment, promote proper body posture and promote proper body mechanics. Progressed resistance, range, repetitions and complexity of movement as indicated. Date:  3/20/17 Date:  3/23/17 Date:  3/27/17   Activity/Exercise Parameters Parameters Parameters   Transverse Abdominus (TA) contraction With all exercises With all activities With all activities   NU step Level 7, x 10 minutes Level 7, x 10 minutes Level 7, x 10 minutes   Mini squats With TA, 2 x 10 With TA, 2 x 10 With TA, 2 x 10   Sit to stand With TA, 2 x 15 With TA, 2 x 10 With TA, 2 x 10   Calf stretch Slant board, 4 x 30 seconds Slant board, 4 x 30 seconds Slant board 4 x 30 seconds   Lateral walks --- Red band at ankles, hallway x 1 each ---   Single leg stance --- --- ---   Lifting 6 inch step, 10 lbs, from stool carried across clinic then lifting 5 lbs overhead  6 inch step from pink stool with 10 lbs, x 5 to left and x 5 to right 6 inch step, 14 lbs, from table across room to chair x 10    Cable cord, 20 lbs, squat lift with bar held close to body, x 10 Cable cord, 17.5 lbs, squat lift with bar held close to body, x 15  6 inch step, 15 lbs, from stool carried across clinic then lifting overhead- 10 lbs x 1, 12 lbs x 1, 15 lbs x 1           Time spent with patient reviewing proper muscle recruitment and technique with exercises.     Manual Therapy (     ): manual strength and ROM measurements    Therapeutic Modalities: for pain/ tightness: HEP: As above; handouts given to patient for all exercises. ______________________________________________________________________________________________________    Treatment/Session Assessment:    · Response to Treatment: Patient with mild tightness with lifting activities and reported 15lbs feeling heavy when walking and carrying weight. Patient to see MD Friday and will await their recommendation regarding continued therapy. 5/9/17: patient did not call for additional appointments and no additional script was received from MD. Patient discharged at this time. · Compliance with Program/Exercises: Compliant most of the time. · Recommendations/Intent for next treatment session: Patient discharged at this time.     Markel Fitzgerald, PT

## 2018-06-13 ENCOUNTER — HOSPITAL ENCOUNTER (OUTPATIENT)
Dept: GENERAL RADIOLOGY | Age: 53
Discharge: HOME | End: 2018-06-13
Attending: SURGERY
Payer: COMMERCIAL

## 2018-06-13 DIAGNOSIS — R13.19 ESOPHAGEAL DYSPHAGIA: ICD-10-CM

## 2018-06-13 PROCEDURE — 74011000255 HC RX REV CODE- 255: Performed by: SURGERY

## 2018-06-13 PROCEDURE — 74220 X-RAY XM ESOPHAGUS 1CNTRST: CPT

## 2018-06-13 RX ADMIN — BARIUM SULFATE 355 ML: 0.6 SUSPENSION ORAL at 09:42

## 2018-06-25 PROBLEM — K22.5 ZENKER'S DIVERTICULUM: Status: ACTIVE | Noted: 2018-06-25

## 2018-07-06 RX ORDER — CEFAZOLIN SODIUM IN 0.9 % NACL 2 G/50 ML
2 INTRAVENOUS SOLUTION, PIGGYBACK (ML) INTRAVENOUS ONCE
Status: CANCELLED | OUTPATIENT
Start: 2018-07-31 | End: 2018-07-31

## 2019-03-27 ENCOUNTER — HOSPITAL ENCOUNTER (OUTPATIENT)
Dept: MAMMOGRAPHY | Age: 54
Discharge: HOME | End: 2019-03-27
Attending: FAMILY MEDICINE

## 2019-03-27 DIAGNOSIS — Z12.39 SCREENING BREAST EXAMINATION: ICD-10-CM

## 2020-06-19 ENCOUNTER — HOSPITAL ENCOUNTER (OUTPATIENT)
Dept: MAMMOGRAPHY | Age: 55
Discharge: HOME | End: 2020-06-19
Attending: FAMILY MEDICINE

## 2020-06-19 DIAGNOSIS — Z12.31 VISIT FOR SCREENING MAMMOGRAM: ICD-10-CM

## 2020-07-07 ENCOUNTER — HOSPITAL ENCOUNTER (OUTPATIENT)
Dept: ULTRASOUND IMAGING | Age: 55
Discharge: HOME | End: 2020-07-07
Attending: FAMILY MEDICINE
Payer: COMMERCIAL

## 2020-07-07 DIAGNOSIS — N95.0 POSTMENOPAUSAL BLEEDING: ICD-10-CM

## 2020-07-07 PROCEDURE — 76830 TRANSVAGINAL US NON-OB: CPT

## 2020-08-06 PROBLEM — N95.1 MENOPAUSAL SYMPTOMS: Status: ACTIVE | Noted: 2020-08-06

## 2020-08-06 PROBLEM — N94.10 DYSPAREUNIA IN FEMALE: Status: ACTIVE | Noted: 2020-08-06

## 2020-08-06 PROBLEM — N95.0 PMB (POSTMENOPAUSAL BLEEDING): Status: ACTIVE | Noted: 2020-08-06

## 2020-08-06 PROCEDURE — 88305 TISSUE EXAM BY PATHOLOGIST: CPT

## 2020-08-07 ENCOUNTER — HOSPITAL ENCOUNTER (OUTPATIENT)
Dept: LAB | Age: 55
Discharge: HOME | End: 2020-08-07

## 2022-03-18 PROBLEM — N95.0 PMB (POSTMENOPAUSAL BLEEDING): Status: ACTIVE | Noted: 2020-08-06

## 2022-03-19 PROBLEM — N94.10 DYSPAREUNIA IN FEMALE: Status: ACTIVE | Noted: 2020-08-06

## 2022-03-19 PROBLEM — N95.1 MENOPAUSAL SYMPTOMS: Status: ACTIVE | Noted: 2020-08-06

## 2022-03-19 PROBLEM — K22.5 ZENKER'S DIVERTICULUM: Status: ACTIVE | Noted: 2018-06-25

## 2022-06-30 ENCOUNTER — APPOINTMENT (OUTPATIENT)
Dept: GENERAL RADIOLOGY | Age: 57
End: 2022-06-30
Payer: COMMERCIAL

## 2022-06-30 ENCOUNTER — APPOINTMENT (OUTPATIENT)
Dept: NON INVASIVE DIAGNOSTICS | Age: 57
End: 2022-06-30
Payer: COMMERCIAL

## 2022-06-30 ENCOUNTER — APPOINTMENT (OUTPATIENT)
Dept: CT IMAGING | Age: 57
End: 2022-06-30
Payer: COMMERCIAL

## 2022-06-30 ENCOUNTER — HOSPITAL ENCOUNTER (OUTPATIENT)
Age: 57
Setting detail: OBSERVATION
Discharge: HOME | End: 2022-07-01
Attending: STUDENT IN AN ORGANIZED HEALTH CARE EDUCATION/TRAINING PROGRAM | Admitting: INTERNAL MEDICINE
Payer: COMMERCIAL

## 2022-06-30 DIAGNOSIS — R20.0 LEFT SIDED NUMBNESS: Primary | ICD-10-CM

## 2022-06-30 DIAGNOSIS — I63.9 ACUTE CEREBROVASCULAR ACCIDENT (CVA) (HCC): ICD-10-CM

## 2022-06-30 DIAGNOSIS — S39.012A BACK STRAIN, INITIAL ENCOUNTER: ICD-10-CM

## 2022-06-30 LAB
ALBUMIN SERPL-MCNC: 3.8 G/DL (ref 3.5–5)
ALBUMIN/GLOB SERPL: 0.8 {RATIO} (ref 1.2–3.5)
ALP SERPL-CCNC: 94 U/L (ref 50–136)
ALT SERPL-CCNC: 18 U/L (ref 12–65)
ANION GAP SERPL CALC-SCNC: 5 MMOL/L (ref 7–16)
APPEARANCE UR: CLEAR
AST SERPL-CCNC: 12 U/L (ref 15–37)
BASOPHILS # BLD: 0.1 K/UL (ref 0–0.2)
BASOPHILS NFR BLD: 1 % (ref 0–2)
BILIRUB SERPL-MCNC: 0.5 MG/DL (ref 0.2–1.1)
BILIRUB UR QL: NEGATIVE
BILIRUB UR QL: NEGATIVE
BUN SERPL-MCNC: 12 MG/DL (ref 6–23)
CALCIUM SERPL-MCNC: 9.3 MG/DL (ref 8.3–10.4)
CHLORIDE SERPL-SCNC: 105 MMOL/L (ref 98–107)
CO2 SERPL-SCNC: 29 MMOL/L (ref 21–32)
COLOR UR: NORMAL
CREAT SERPL-MCNC: 1 MG/DL (ref 0.6–1)
DIFFERENTIAL METHOD BLD: NORMAL
ECHO AO ASC DIAM: 2.7 CM
ECHO AO ASCENDING AORTA INDEX: 1.43 CM/M2
ECHO AO ROOT DIAM: 2.6 CM
ECHO AO ROOT INDEX: 1.38 CM/M2
ECHO AV AREA PEAK VELOCITY: 1.6 CM2
ECHO AV AREA VTI: 1.8 CM2
ECHO AV AREA/BSA PEAK VELOCITY: 0.8 CM2/M2
ECHO AV AREA/BSA VTI: 1 CM2/M2
ECHO AV MEAN GRADIENT: 5 MMHG
ECHO AV MEAN VELOCITY: 1 M/S
ECHO AV PEAK GRADIENT: 11 MMHG
ECHO AV PEAK VELOCITY: 1.6 M/S
ECHO AV VELOCITY RATIO: 0.63
ECHO AV VTI: 32.7 CM
ECHO BSA: 1.93 M2
ECHO EST RA PRESSURE: 3 MMHG
ECHO IVC PROX: 1.5 CM
ECHO LA AREA 2C: 14.9 CM2
ECHO LA AREA 4C: 17 CM2
ECHO LA DIAMETER INDEX: 1.53 CM/M2
ECHO LA DIAMETER: 2.9 CM
ECHO LA MAJOR AXIS: 5.6 CM
ECHO LA MINOR AXIS: 5.2 CM
ECHO LA TO AORTIC ROOT RATIO: 1.12
ECHO LA VOL BP: 39 ML (ref 22–52)
ECHO LA VOL/BSA BIPLANE: 21 ML/M2 (ref 16–34)
ECHO LV E' LATERAL VELOCITY: 13 CM/S
ECHO LV E' SEPTAL VELOCITY: 11 CM/S
ECHO LV FRACTIONAL SHORTENING: 34 % (ref 28–44)
ECHO LV INTERNAL DIMENSION DIASTOLE INDEX: 2.01 CM/M2
ECHO LV INTERNAL DIMENSION DIASTOLIC: 3.8 CM (ref 3.9–5.3)
ECHO LV INTERNAL DIMENSION SYSTOLIC INDEX: 1.32 CM/M2
ECHO LV INTERNAL DIMENSION SYSTOLIC: 2.5 CM
ECHO LV IVSD: 0.9 CM (ref 0.6–0.9)
ECHO LV MASS 2D: 109 G (ref 67–162)
ECHO LV MASS INDEX 2D: 57.7 G/M2 (ref 43–95)
ECHO LV POSTERIOR WALL DIASTOLIC: 1 CM (ref 0.6–0.9)
ECHO LV RELATIVE WALL THICKNESS RATIO: 0.53
ECHO LVOT AREA: 2.5 CM2
ECHO LVOT AV VTI INDEX: 0.72
ECHO LVOT DIAM: 1.8 CM
ECHO LVOT MEAN GRADIENT: 2 MMHG
ECHO LVOT PEAK GRADIENT: 4 MMHG
ECHO LVOT PEAK VELOCITY: 1 M/S
ECHO LVOT STROKE VOLUME INDEX: 31.9 ML/M2
ECHO LVOT SV: 60.3 ML
ECHO LVOT VTI: 23.7 CM
ECHO MV A VELOCITY: 1.05 M/S
ECHO MV E DECELERATION TIME (DT): 188 MS
ECHO MV E VELOCITY: 1.08 M/S
ECHO MV E/A RATIO: 1.03
ECHO MV E/E' LATERAL: 8.31
ECHO MV E/E' RATIO (AVERAGED): 9.06
ECHO MV E/E' SEPTAL: 9.82
ECHO RIGHT VENTRICULAR SYSTOLIC PRESSURE (RVSP): 31 MMHG
ECHO RV BASAL DIMENSION: 3.2 CM
ECHO RV TAPSE: 2.1 CM (ref 1.7–?)
ECHO TV REGURGITANT MAX VELOCITY: 2.63 M/S
ECHO TV REGURGITANT PEAK GRADIENT: 28 MMHG
EKG ATRIAL RATE: 81 BPM
EKG DIAGNOSIS: NORMAL
EKG P AXIS: 62 DEGREES
EKG P-R INTERVAL: 129 MS
EKG Q-T INTERVAL: 366 MS
EKG QRS DURATION: 83 MS
EKG QTC CALCULATION (BAZETT): 425 MS
EKG R AXIS: 64 DEGREES
EKG T AXIS: 39 DEGREES
EKG VENTRICULAR RATE: 81 BPM
EOSINOPHIL # BLD: 0.1 K/UL (ref 0–0.8)
EOSINOPHIL NFR BLD: 1 % (ref 0.5–7.8)
ERYTHROCYTE [DISTWIDTH] IN BLOOD BY AUTOMATED COUNT: 13.9 % (ref 11.9–14.6)
GLOBULIN SER CALC-MCNC: 4.6 G/DL (ref 2.3–3.5)
GLUCOSE BLD STRIP.AUTO-MCNC: 99 MG/DL (ref 65–100)
GLUCOSE SERPL-MCNC: 105 MG/DL (ref 65–100)
GLUCOSE UR QL STRIP.AUTO: NEGATIVE MG/DL
GLUCOSE UR STRIP.AUTO-MCNC: NEGATIVE MG/DL
HCT VFR BLD AUTO: 38 % (ref 35.8–46.3)
HGB BLD-MCNC: 12.4 G/DL (ref 11.7–15.4)
HGB UR QL STRIP: NEGATIVE
IMM GRANULOCYTES # BLD AUTO: 0 K/UL (ref 0–0.5)
IMM GRANULOCYTES NFR BLD AUTO: 0 % (ref 0–5)
INR BLD: 1 (ref 0.9–1.2)
INR PPP: 1
KETONES UR QL STRIP.AUTO: NEGATIVE MG/DL
KETONES UR-MCNC: NEGATIVE MG/DL
LEUKOCYTE ESTERASE UR QL STRIP.AUTO: NEGATIVE
LEUKOCYTE ESTERASE UR QL STRIP: NEGATIVE
LIPASE SERPL-CCNC: 41 U/L (ref 73–393)
LYMPHOCYTES # BLD: 2.4 K/UL (ref 0.5–4.6)
LYMPHOCYTES NFR BLD: 29 % (ref 13–44)
MCH RBC QN AUTO: 27.6 PG (ref 26.1–32.9)
MCHC RBC AUTO-ENTMCNC: 32.6 G/DL (ref 31.4–35)
MCV RBC AUTO: 84.6 FL (ref 79.6–97.8)
MONOCYTES # BLD: 0.7 K/UL (ref 0.1–1.3)
MONOCYTES NFR BLD: 8 % (ref 4–12)
NEUTS SEG # BLD: 5.1 K/UL (ref 1.7–8.2)
NEUTS SEG NFR BLD: 61 % (ref 43–78)
NITRITE UR QL STRIP.AUTO: NEGATIVE
NITRITE UR QL: NEGATIVE
NRBC # BLD: 0 K/UL (ref 0–0.2)
NT PRO BNP: 16 PG/ML (ref 5–125)
PH UR STRIP: 7 [PH] (ref 5–9)
PH UR: 6.5 [PH] (ref 5–9)
PLATELET # BLD AUTO: 301 K/UL (ref 150–450)
PMV BLD AUTO: 10.9 FL (ref 9.4–12.3)
POTASSIUM SERPL-SCNC: 4.2 MMOL/L (ref 3.5–5.1)
PROT SERPL-MCNC: 8.4 G/DL (ref 6.3–8.2)
PROT UR QL: NEGATIVE MG/DL
PROT UR STRIP-MCNC: NEGATIVE MG/DL
PROTHROMBIN TIME: 13.3 SEC (ref 12.6–14.5)
PT BLD: 12.1 SECS (ref 9.6–11.6)
RBC # BLD AUTO: 4.49 M/UL (ref 4.05–5.2)
RBC # UR STRIP: ABNORMAL /UL
SERVICE CMNT-IMP: ABNORMAL
SERVICE CMNT-IMP: NORMAL
SODIUM SERPL-SCNC: 139 MMOL/L (ref 136–145)
SP GR UR REFRACTOMETRY: 1.01 (ref 1–1.02)
SP GR UR: 1.01 (ref 1–1.02)
TROPONIN I SERPL HS-MCNC: 3 PG/ML (ref 0–14)
TSH W FREE THYROID IF ABNORMAL: 3.01 UIU/ML (ref 0.36–3.74)
UROBILINOGEN UR QL STRIP.AUTO: 0.2 EU/DL (ref 0.2–1)
UROBILINOGEN UR QL: 0.2 EU/DL (ref 0.2–1)
WBC # BLD AUTO: 8.3 K/UL (ref 4.3–11.1)

## 2022-06-30 PROCEDURE — 6360000002 HC RX W HCPCS: Performed by: INTERNAL MEDICINE

## 2022-06-30 PROCEDURE — 96366 THER/PROPH/DIAG IV INF ADDON: CPT

## 2022-06-30 PROCEDURE — 96365 THER/PROPH/DIAG IV INF INIT: CPT

## 2022-06-30 PROCEDURE — 70496 CT ANGIOGRAPHY HEAD: CPT

## 2022-06-30 PROCEDURE — 97162 PT EVAL MOD COMPLEX 30 MIN: CPT

## 2022-06-30 PROCEDURE — 85025 COMPLETE CBC W/AUTO DIFF WBC: CPT

## 2022-06-30 PROCEDURE — 84484 ASSAY OF TROPONIN QUANT: CPT

## 2022-06-30 PROCEDURE — 1100000003 HC PRIVATE W/ TELEMETRY

## 2022-06-30 PROCEDURE — 84443 ASSAY THYROID STIM HORMONE: CPT

## 2022-06-30 PROCEDURE — 6370000000 HC RX 637 (ALT 250 FOR IP): Performed by: INTERNAL MEDICINE

## 2022-06-30 PROCEDURE — 80053 COMPREHEN METABOLIC PANEL: CPT

## 2022-06-30 PROCEDURE — 83690 ASSAY OF LIPASE: CPT

## 2022-06-30 PROCEDURE — 72100 X-RAY EXAM L-S SPINE 2/3 VWS: CPT

## 2022-06-30 PROCEDURE — 97530 THERAPEUTIC ACTIVITIES: CPT

## 2022-06-30 PROCEDURE — 96375 TX/PRO/DX INJ NEW DRUG ADDON: CPT

## 2022-06-30 PROCEDURE — 99222 1ST HOSP IP/OBS MODERATE 55: CPT | Performed by: PSYCHIATRY & NEUROLOGY

## 2022-06-30 PROCEDURE — 6360000002 HC RX W HCPCS: Performed by: STUDENT IN AN ORGANIZED HEALTH CARE EDUCATION/TRAINING PROGRAM

## 2022-06-30 PROCEDURE — 2580000003 HC RX 258: Performed by: INTERNAL MEDICINE

## 2022-06-30 PROCEDURE — 85610 PROTHROMBIN TIME: CPT

## 2022-06-30 PROCEDURE — 93306 TTE W/DOPPLER COMPLETE: CPT

## 2022-06-30 PROCEDURE — G0378 HOSPITAL OBSERVATION PER HR: HCPCS

## 2022-06-30 PROCEDURE — 76376 3D RENDER W/INTRP POSTPROCES: CPT | Performed by: INTERNAL MEDICINE

## 2022-06-30 PROCEDURE — 6360000004 HC RX CONTRAST MEDICATION: Performed by: INTERNAL MEDICINE

## 2022-06-30 PROCEDURE — 83880 ASSAY OF NATRIURETIC PEPTIDE: CPT

## 2022-06-30 PROCEDURE — 81003 URINALYSIS AUTO W/O SCOPE: CPT

## 2022-06-30 PROCEDURE — 71045 X-RAY EXAM CHEST 1 VIEW: CPT

## 2022-06-30 PROCEDURE — 99285 EMERGENCY DEPT VISIT HI MDM: CPT

## 2022-06-30 PROCEDURE — 96374 THER/PROPH/DIAG INJ IV PUSH: CPT

## 2022-06-30 PROCEDURE — 93005 ELECTROCARDIOGRAM TRACING: CPT | Performed by: STUDENT IN AN ORGANIZED HEALTH CARE EDUCATION/TRAINING PROGRAM

## 2022-06-30 PROCEDURE — 93306 TTE W/DOPPLER COMPLETE: CPT | Performed by: INTERNAL MEDICINE

## 2022-06-30 PROCEDURE — 96372 THER/PROPH/DIAG INJ SC/IM: CPT

## 2022-06-30 PROCEDURE — 70450 CT HEAD/BRAIN W/O DYE: CPT

## 2022-06-30 PROCEDURE — 1100000000 HC RM PRIVATE

## 2022-06-30 PROCEDURE — 96368 THER/DIAG CONCURRENT INF: CPT

## 2022-06-30 PROCEDURE — 82962 GLUCOSE BLOOD TEST: CPT

## 2022-06-30 RX ORDER — ENOXAPARIN SODIUM 100 MG/ML
40 INJECTION SUBCUTANEOUS DAILY
Status: DISCONTINUED | OUTPATIENT
Start: 2022-06-30 | End: 2022-07-01 | Stop reason: HOSPADM

## 2022-06-30 RX ORDER — ONDANSETRON 4 MG/1
4 TABLET, ORALLY DISINTEGRATING ORAL EVERY 8 HOURS PRN
Status: DISCONTINUED | OUTPATIENT
Start: 2022-06-30 | End: 2022-07-01 | Stop reason: HOSPADM

## 2022-06-30 RX ORDER — ASPIRIN 300 MG/1
300 SUPPOSITORY RECTAL DAILY
Status: DISCONTINUED | OUTPATIENT
Start: 2022-06-30 | End: 2022-07-01 | Stop reason: HOSPADM

## 2022-06-30 RX ORDER — FLUOXETINE HYDROCHLORIDE 20 MG/1
20 CAPSULE ORAL DAILY
Status: DISCONTINUED | OUTPATIENT
Start: 2022-06-30 | End: 2022-07-01 | Stop reason: HOSPADM

## 2022-06-30 RX ORDER — MORPHINE SULFATE 4 MG/ML
4 INJECTION INTRAVENOUS
Status: COMPLETED | OUTPATIENT
Start: 2022-06-30 | End: 2022-06-30

## 2022-06-30 RX ORDER — 0.9 % SODIUM CHLORIDE 0.9 %
100 INTRAVENOUS SOLUTION INTRAVENOUS
Status: COMPLETED | OUTPATIENT
Start: 2022-06-30 | End: 2022-07-01

## 2022-06-30 RX ORDER — OXYCODONE HYDROCHLORIDE 5 MG/1
5 TABLET ORAL EVERY 6 HOURS PRN
Status: DISCONTINUED | OUTPATIENT
Start: 2022-06-30 | End: 2022-07-01 | Stop reason: HOSPADM

## 2022-06-30 RX ORDER — ONDANSETRON 2 MG/ML
4 INJECTION INTRAMUSCULAR; INTRAVENOUS EVERY 6 HOURS PRN
Status: DISCONTINUED | OUTPATIENT
Start: 2022-06-30 | End: 2022-07-01 | Stop reason: HOSPADM

## 2022-06-30 RX ORDER — LORAZEPAM 1 MG/1
1 TABLET ORAL DAILY PRN
Status: DISCONTINUED | OUTPATIENT
Start: 2022-06-30 | End: 2022-07-01 | Stop reason: HOSPADM

## 2022-06-30 RX ORDER — LORAZEPAM 1 MG/1
1 TABLET ORAL DAILY PRN
COMMUNITY

## 2022-06-30 RX ORDER — FLUOXETINE HYDROCHLORIDE 20 MG/1
20 CAPSULE ORAL DAILY
COMMUNITY

## 2022-06-30 RX ORDER — POLYETHYLENE GLYCOL 3350 17 G/17G
17 POWDER, FOR SOLUTION ORAL DAILY PRN
Status: DISCONTINUED | OUTPATIENT
Start: 2022-06-30 | End: 2022-07-01 | Stop reason: HOSPADM

## 2022-06-30 RX ORDER — SODIUM CHLORIDE 0.9 % (FLUSH) 0.9 %
10 SYRINGE (ML) INJECTION
Status: COMPLETED | OUTPATIENT
Start: 2022-06-30 | End: 2022-06-30

## 2022-06-30 RX ORDER — ASPIRIN 81 MG/1
81 TABLET ORAL DAILY
Status: DISCONTINUED | OUTPATIENT
Start: 2022-06-30 | End: 2022-07-01 | Stop reason: HOSPADM

## 2022-06-30 RX ORDER — ATORVASTATIN CALCIUM 40 MG/1
40 TABLET, FILM COATED ORAL NIGHTLY
Status: DISCONTINUED | OUTPATIENT
Start: 2022-06-30 | End: 2022-07-01 | Stop reason: HOSPADM

## 2022-06-30 RX ORDER — MORPHINE SULFATE 2 MG/ML
2 INJECTION, SOLUTION INTRAMUSCULAR; INTRAVENOUS EVERY 4 HOURS PRN
Status: DISCONTINUED | OUTPATIENT
Start: 2022-06-30 | End: 2022-07-01 | Stop reason: HOSPADM

## 2022-06-30 RX ORDER — KETOROLAC TROMETHAMINE 15 MG/ML
15 INJECTION, SOLUTION INTRAMUSCULAR; INTRAVENOUS ONCE
Status: COMPLETED | OUTPATIENT
Start: 2022-06-30 | End: 2022-06-30

## 2022-06-30 RX ADMIN — SODIUM CHLORIDE 100 ML: 9 INJECTION, SOLUTION INTRAVENOUS at 11:42

## 2022-06-30 RX ADMIN — OXYCODONE 5 MG: 5 TABLET ORAL at 13:42

## 2022-06-30 RX ADMIN — FLUOXETINE 20 MG: 20 CAPSULE ORAL at 13:42

## 2022-06-30 RX ADMIN — SODIUM CHLORIDE, PRESERVATIVE FREE 10 ML: 5 INJECTION INTRAVENOUS at 11:30

## 2022-06-30 RX ADMIN — ENOXAPARIN SODIUM 40 MG: 40 INJECTION SUBCUTANEOUS at 12:20

## 2022-06-30 RX ADMIN — ASPIRIN 81 MG: 81 TABLET ORAL at 12:01

## 2022-06-30 RX ADMIN — KETOROLAC TROMETHAMINE 15 MG: 15 INJECTION, SOLUTION INTRAMUSCULAR; INTRAVENOUS at 08:14

## 2022-06-30 RX ADMIN — SODIUM CHLORIDE 100 ML: 9 INJECTION, SOLUTION INTRAVENOUS at 11:29

## 2022-06-30 RX ADMIN — IOPAMIDOL 100 ML: 755 INJECTION, SOLUTION INTRAVENOUS at 11:41

## 2022-06-30 RX ADMIN — OXYCODONE 5 MG: 5 TABLET ORAL at 19:40

## 2022-06-30 RX ADMIN — ATORVASTATIN CALCIUM 40 MG: 40 TABLET, FILM COATED ORAL at 19:40

## 2022-06-30 RX ADMIN — MORPHINE SULFATE 4 MG: 4 INJECTION INTRAVENOUS at 08:49

## 2022-06-30 RX ADMIN — SODIUM CHLORIDE, PRESERVATIVE FREE 10 ML: 5 INJECTION INTRAVENOUS at 11:42

## 2022-06-30 ASSESSMENT — PAIN SCALES - GENERAL
PAINLEVEL_OUTOF10: 0
PAINLEVEL_OUTOF10: 0
PAINLEVEL_OUTOF10: 3
PAINLEVEL_OUTOF10: 6
PAINLEVEL_OUTOF10: 8
PAINLEVEL_OUTOF10: 6
PAINLEVEL_OUTOF10: 7
PAINLEVEL_OUTOF10: 8
PAINLEVEL_OUTOF10: 10

## 2022-06-30 ASSESSMENT — ENCOUNTER SYMPTOMS
SORE THROAT: 0
APNEA: 0
WHEEZING: 0
COUGH: 0
ANAL BLEEDING: 0
COLOR CHANGE: 0
RHINORRHEA: 0
CHEST TIGHTNESS: 0
SHORTNESS OF BREATH: 0
EYE PAIN: 0
ABDOMINAL PAIN: 0
ABDOMINAL DISTENTION: 0
NAUSEA: 0
EYE DISCHARGE: 0
VOMITING: 0
EYE ITCHING: 0
DIARRHEA: 0
BACK PAIN: 1
EYE REDNESS: 0

## 2022-06-30 ASSESSMENT — PAIN DESCRIPTION - DESCRIPTORS
DESCRIPTORS: ACHING
DESCRIPTORS: ACHING
DESCRIPTORS: SHARP
DESCRIPTORS: STABBING
DESCRIPTORS: STABBING

## 2022-06-30 ASSESSMENT — PAIN DESCRIPTION - ORIENTATION
ORIENTATION: LEFT;MID
ORIENTATION: POSTERIOR
ORIENTATION: POSTERIOR
ORIENTATION: LEFT;LOWER
ORIENTATION: LOWER

## 2022-06-30 ASSESSMENT — PAIN DESCRIPTION - LOCATION
LOCATION: BACK
LOCATION: FACE
LOCATION: BACK

## 2022-06-30 NOTE — ED TRIAGE NOTES
Left facial numbness, back pain,  left arm numbness, which started on Monday    Having sob and ankle swelling for about one week     Numbness to left arm and face started this morning after she woke

## 2022-06-30 NOTE — ED NOTES
TRANSFER - OUT REPORT:    Verbal report given to Desirae eMjia on Emilia Sanches  being transferred to [address] [place] [address] for routine progression of care. Report consisted of patient's Situation, Background, Assessment and   Recommendations(SBAR). Information from the following report(s) ED SBAR was reviewed with the receiving nurse. Lines:   Peripheral IV Left Antecubital (Active)   Site Assessment Clean, dry & intact 06/30/22 0704   Line Status Blood return noted 06/30/22 0704   Line Care Cap changed 06/30/22 0704   Phlebitis Assessment No symptoms 06/30/22 0704   Infiltration Assessment 0 06/30/22 0704   Dressing Status Clean, dry & intact 06/30/22 3573        Opportunity for questions and clarification was provided.       Patient transported with:  Registered Nurse     Chalo Garcia RN  06/30/22 1695

## 2022-06-30 NOTE — CARE COORDINATION
Chart review complete, CM met with pt at bedside, pt found laying on stretcher, pt lives alone but plans if needed to stay with boyfriend Suzette Voss at [facility] if needed. Demographics, insurance and PCP confirmed. CM staff will remain available to assist as needed,  HCPOA/LW paperwork provided to pt per request aware hospital  will need to be contacted to complete paperwork. 06/30/22 0908   Service Assessment   Patient Orientation Alert and Oriented;Place;Person;Situation;Self   Cognition Alert   History Provided By Patient   Primary Caregiver Self   Accompanied By/Relationship none   Support Systems Spouse/Significant Other;Children   Patient's Healthcare Decision Maker is: Legal Next of Kin   PCP Verified by CM Yes  (Dr Saavedra )   Last Visit to PCP Within last year   Prior Functional Level Independent in ADLs/IADLs   Current Functional Level Independent in ADLs/IADLs   Can patient return to prior living arrangement Yes   Ability to make needs known: Good   Family able to assist with home care needs: Yes   Would you like for me to discuss the discharge plan with any other family members/significant others, and if so, who? No   CM/SW Referral Other (see comment)  (dispo)   Social/Functional History   Lives With Alone   Type of [address] One level   Home Access Stairs to enter with rails   Entrance Stairs - Number of Steps 1   [address]   (none)   Receives Help From Friend(s)   ADL Assistance Independent   Homemaking Assistance Independent   Ambulation Assistance Independent   Transfer Assistance Independent   Mode of Transportation Car   Occupation Full time employment   Discharge Planning   Type of [address] Prior To Admission None   Potential Assistance Needed N/A   DME Ordered?  No   Potential Assistance Purchasing Medications No   Patient expects to be discharged to: [facility] One/Two Story Residence One story   History of falls? 0

## 2022-06-30 NOTE — PROGRESS NOTES
Occupational Therapy Note:    Attempted to see patient this PM for occupational therapy evaluation session. Patient currently with [facility]. Will follow and re-attempt as schedule permits/patient available.  Thank you,    Aron Erm

## 2022-06-30 NOTE — H&P
Admit date: 2022   Name:  Asa Ignacio   Age:  62 y.o.   :  1965   MRN:  [id number]   PCP:  Kash Recinos MD   Provider:  Guerline Chowdhury MD      ASSESSMENT AND PLAN  63-year-old female with past medical history of depression, anxiety, presents in setting of left facial numbness and left upper extremity numbness    1. Left facial and left upper extremity numbness concerning of acute ischemic CVA  -CT brain without intracranial bleeding  -Not a candidate for TPA  -Obtain CTA of head and neck  -Obtain MRI of the brain  -Start aspirin and statin  -Telemetry monitoring  -Neurochecks every 4 hours  -TSH, T4, lipid panel, A1c  -SLP, PT, OT evaluation  -Permissive hypertension  -Echocardiogram  -Neurology consultation     2. Depression and anxiety  -Continue home dose Prozac    3. Lower back pain  -Obtain MRI of the lumbar spine  -Pain management    DVT prophylaxis with Lovenox    Full code    Patient aware of plan      CHIEF COMPLAINT:  Left facial numbness, left upper extremity numbness      HISTORY OF PRESENT ILLNESS:  63-year-old female with past medical history of depression, anxiety, presents in setting of left facial numbness and left upper extremity numbness. The patient states that she was in her usual state of health until this morning around 7 AM when she woke up and presented with left-sided facial numbness associated with left upper extremity numbness. Her symptoms did not improve so she decided to come to the emergency department. Otherwise she denies any visual disturbances, no headaches, no facial droop, no slurred speech, no upper or lower extremity weakness. In the emergency department code S was started,CT of the brain without acute intracranial abnormalities. Teleneurology was consulted and recommended admission for MRI. The patient will be admitted to the medical floors for further management.         Past Medical History:   Diagnosis Date    Anemia     Depression anxiety, pt reports started after son was killed       Past Surgical History:   Procedure Laterality Date    BREAST BIOPSY Bilateral UNKNOWN    ORTHOPEDIC SURGERY      right wrist surgery    TUBAL LIGATION  1988    US THYROID BIOPSY  3/22/2018     THYROID BIOPSY [facility] AMB HISTORICAL          HOME MEDICATION:  Prior to Admission medications    Medication Sig Start Date End Date Taking? Authorizing Provider   FLUoxetine (PROZAC) 20 MG capsule Take 20 mg by mouth daily   Yes Historical Provider, MD   LORazepam (ATIVAN) 1 MG tablet Take 1 mg by mouth daily as needed for Anxiety. Yes Historical Provider, MD         REVIEW OF SYSTEMS:  14 ROS negative except from stated on HPI      Social History     Tobacco Use    Smoking status: Never Smoker    Smokeless tobacco: Never Used   Substance Use Topics    Alcohol use:  Yes    Drug use: No         Family History   Problem Relation Age of Onset    Diabetes Mother     Hypertension Mother     Cancer Father         prostate    Breast Cancer Maternal Cousin     Ovarian Cancer Neg Hx     Uterine Cancer Neg Hx     Colon Cancer Neg Hx          No Known Allergies      Vitals:    06/30/22 0630 06/30/22 0930 06/30/22 1107 06/30/22 1342   BP: (!) 152/89 (!) 142/74 (!) 144/75    Pulse: 93 79 74    Resp: 16 13 18 18   Temp: 98.7 °F (37.1 °C)  98.1 °F (36.7 °C)    TempSrc: Oral  Oral    SpO2: 98% 97% 100%    Weight: 176 lb (79.8 kg)      Height: 5' 6\" (1.676 m)            PHYSICAL EXAM:  General: Alert, oriented, NAD  HEENT: NC/AT, EOM are intact  Neck: supple, no JVD  Cardiovascular: RRR, S1, S2, no murmurs  Respiratory: Lungs are clear, no wheezes or rales  Abdomen: Soft, NT, ND  Back: No CVA tenderness, no paraspinal tenderness  Extremities: LE without pedal edema, no erythema  Neuro: A&O, CN are intact, no focal deficits  Skin: no rash or ulcers  Psych: good mood and affect      I have personally reviewed patients laboratory data showing  Lab Results   Component Value Date    WBC 8.3 06/30/2022    HGB 12.4 06/30/2022    HCT 38.0 06/30/2022    MCV 84.6 06/30/2022     06/30/2022     No results found for: CKTOTAL, CKMB, CKMBINDEX, TROPONINI  Lab Results   Component Value Date    ANIONGAP 5 (L) 06/30/2022    CALCIUM 9.3 06/30/2022     06/30/2022    K 4.2 06/30/2022    CO2 29 06/30/2022     06/30/2022    BUN 12 06/30/2022    CREATININE 1.00 06/30/2022         I have personally reviewed patients EKG showing  Normal sinus rhythm      I have personally reviewed patients imaging showing  CT of the brain without acute intracranial abnormalities    Likely length of stay more than 2 midnights

## 2022-06-30 NOTE — CONSULTS
[facility] [address]  [address] [facility]            Armand Stein is a 62 y.o. female who presents on referral from hospitalist service with regards to the onset at approximately 05 30 with a last known well time at 9 PM of numbness and tingling to the left side of the face and left arm. The patient noted this immediately on awakening. No associated weakness heaviness difficulty with balance coordination visual disturbance etc.      Past Medical History:   Diagnosis Date    Anemia     Depression     anxiety, pt reports started after son was killed       Past Surgical History:   Procedure Laterality Date    BREAST BIOPSY Bilateral UNKNOWN    ORTHOPEDIC SURGERY      right wrist surgery    TUBAL LIGATION  1988     THYROID BIOPSY  3/22/2018     THYROID BIOPSY [facility] AMB HISTORICAL        Family History   Problem Relation Age of Onset    Diabetes Mother     Hypertension Mother     Cancer Father         prostate    Breast Cancer Maternal Cousin     Ovarian Cancer Neg Hx     Uterine Cancer Neg Hx     Colon Cancer Neg Hx         Social History     Socioeconomic History    Marital status: Single     Spouse name: Not on file    Number of children: Not on file    Years of education: Not on file    Highest education level: Not on file   Occupational History    Not on file   Tobacco Use    Smoking status: Never Smoker    Smokeless tobacco: Never Used   Substance and Sexual Activity    Alcohol use:  Yes    Drug use: No    Sexual activity: Not on file   Other Topics Concern    Not on file   Social History Narrative    Abuse: Feels safe at home, no history of physical abuse, no history of sexual abuse       Social Determinants of Health     Financial Resource Strain:     Difficulty of Paying Living Expenses: Not on file   Food Insecurity:     Worried About Running Out of Food in the Last Year: Not on file    Amy of Food in the Last Year: Not on file   Transportation Needs:     Lack of Transportation (Medical): Not on file    Lack of Transportation (Non-Medical):  Not on file   Physical Activity:     Days of Exercise per Week: Not on file    Minutes of Exercise per Session: Not on file   Stress:     Feeling of Stress : Not on file   Social Connections:     Frequency of Communication with Friends and Family: Not on file    Frequency of Social Gatherings with Friends and Family: Not on file    Attends [employer] Services: Not on file    Active Member of Clubs or Organizations: Not on file    Attends Club or Organization Meetings: Not on file    Marital Status: Not on file   Intimate Partner Violence:     Fear of Current or Ex-Partner: Not on file    Emotionally Abused: Not on file    Physically Abused: Not on file    Sexually Abused: Not on file   Housing Stability:     Unable to Pay for Housing in the Last Year: Not on file    Number of [place] in the Last Year: Not on file    Unstable Housing in the Last Year: Not on file         Current Facility-Administered Medications   Medication Dose Route Frequency Provider Last Rate Last Admin    ondansetron (ZOFRAN-ODT) disintegrating tablet 4 mg  4 mg Oral Q8H PRN Rakan Tam MD        Or    ondansetron [facility] injection 4 mg  4 mg IntraVENous Q6H PRN Rakan Tam MD        polyethylene glycol [facility]) packet 17 g  17 g Oral Daily PRN Rakan Tam MD        enoxaparin (LOVENOX) injection 40 mg  40 mg SubCUTAneous Daily Rakan Tam MD   40 mg at 06/30/22 1220    aspirin EC tablet 81 mg  81 mg Oral Daily Rakan Tam MD   81 mg at 06/30/22 1201    Or    aspirin suppository 300 mg  300 mg Rectal Daily Rakan Tam MD        atorvastatin (LIPITOR) tablet 40 mg  40 mg Oral Nightly Rakan Tam MD        FLUoxetine (PROZAC) capsule 20 mg  20 mg Oral Daily Rakan Tam MD   20 mg at 06/30/22 1342    LORazepam (ATIVAN) tablet 1 mg  1 mg Oral Daily PRN Jonnie Gandara MD        oxyCODONE (ROXICODONE) immediate release tablet 5 mg  5 mg Oral Q6H PRN Jonnie Gandara MD   5 mg at 06/30/22 1342    morphine injection 2 mg  2 mg IntraVENous Q4H PRN Jonnie Gandara MD            No Known Allergies    Review of Systems  Review of systems  General reports normal energy. Sleep wake cycle appetite  ENT no sinus congestion no epistaxis no unilateral hearing loss no swallowing difficulty  Eyes-denies any prior problem with unilateral loss of vision  Pulmonary-denies shortness of breath, no orthopnea, no wheezing, no productive sputum no hemoptysis  Cardiac denies chest pain palpitations peripheral edema  GI weight is stable appetite steady no constipation diarrhea abdominal pain  Joints no inflammation no hip pain or shoulder pain no inflammation. No new onset back pain  Skin no rash or ecchymosis  Neuro no syncope vertigo diplopia dysarthria dysphagia difficulty with balance or coordination unilateral weakness. Denies Lhermitte symptom   no nocturia. Urinary control is normal.  Current sexual function no issues  Psychiatric no mood disorder no yenny no depression no outbursts or belligerent behavior    BP (!) 118/59   Pulse 85   Temp 97.9 °F (36.6 °C)   Resp 18   Ht 5' 6\" (1.676 m)   Wt 176 lb (79.8 kg)   SpO2 99%   BMI 28.41 kg/m²     Neurologic Exam  The patient is alert cooperative and oriented. No evident skin lesions no evidence of excessive bruising no trauma  Patient looks well-hydrated. Does not appear chronically ill. Cranial nerve examination normal visual fields. Normal random eye movements. No ptosis. No lid lag. There is some symptomatic sensory loss on the left which appears organic based upon the patient reporting sensation to vibration over the forehead to be symmetric.   Face moves strongly symmetrically and equally  Speech is normal  Motor  Upper extremities  Normal bulk strength tone and symmetric arm and/or continuous wave Doppler was performed. Most recent lipid panels  No results found for: CHOL, CHOLPOCT, CHOLX, CHLST, CHOLV, HDL, HDLPOC, HDLC, LDL, LDLC, VLDLC, VLDL, TGLX, TRIGL    Most recent Hgb A1C  No results found for: HBA1C, [id number]      Assessment/Plan: Onset of neurologic symptoms in the left face and arm. The nature of the problem suggests the possibility of a thalamic or subthalamic origin differential diagnosis includes ischemia or demyelination no history of any prior event to suggest the latter  The finding of fibromuscular dysplasia is a variant probably unrelated to the chief complaint currently. My review of the CTA reveals no significant findings and I do not believe that the interval finding is of significance  Recommend treatment with statin and aspirin pending results of the MRI  Routine monitoring for paroxysmal atrial fibrillation although suspect that that is unlikely here.           Vinicio Reeves MD

## 2022-06-30 NOTE — PROGRESS NOTES
SPEECH PATHOLOGY NOTE    Speech therapy consult received and appreciated. Patient was not in her room at time of attempt. Will follow up for dysphagia evaluation at later time as patient availability and schedule permits. ADDENDUM:  Patient currently transferring to the floor at time of second speech therapy attempt. She was working with PT at time of third attempt. Will follow up at later time.      SHEYLA Miller, CCC-SLP  Speech Language Pathologist  Acute Rehabilitation Services  Contact: Cesar

## 2022-06-30 NOTE — ED PROVIDER NOTES
[facility] Emergency Department Provider Note                   PCP:                Lea Newton MD               Age: 62 y.o. Sex: female     No diagnosis found. DISPOSITION         New Prescriptions    No medications on file       Orders Placed This Encounter   Procedures    CT HEAD WO CONTRAST    XR CHEST PORTABLE    XR LUMBAR SPINE (2-3 VIEWS)    CBC with Auto Differential    Comprehensive Metabolic Panel    Protime-INR    Troponin    Lipase    Urinalysis    Diet NPO    NIHSS    Nursing swallow assessment    POCT Glucose    POCT INR    POCT Glucose    EKG 12 Lead        Khang Briceno,  7:01 AM      MDM  Number of Diagnoses or Management Options  Back strain, initial encounter: new, needed workup  Left sided numbness: new, needed workup  Diagnosis management comments: Given onset of symptoms at 515 this morning, Code S was initiated for patient's numbness. Full labs have been collected as well to further investigate patient's back pain and left upper quadrant discomfort. Amount and/or Complexity of Data Reviewed  Clinical lab tests: ordered and reviewed  Tests in the radiology section of CPT®: ordered and reviewed  Tests in the medicine section of CPT®: ordered and reviewed  Independent visualization of images, tracings, or specimens: yes    Risk of Complications, Morbidity, and/or Mortality  Presenting problems: high  Diagnostic procedures: high  Management options: high Alaina Valles is a 62 y.o. female who presents to the Emergency Department with chief complaint of    Chief Complaint   Patient presents with    Back Pain    Facial Pain      77-year-old female patient presents to this department with multiple medical complaints. Her chief complaint is numbness to the left face and arm that started around 515 this morning. Patient states she woke feeling normally and then her symptoms progressed at that time.   She also reports low back pain of which she has a history of but states it is worsened over the past several days. She denies falls or trauma and reports no associated fever or chills. There is no lower extremity numbness tingling or weakness reported and she denies any changes in bowel or bladder habits. Patient reports no visual change, dizzy or lightheaded feeling. She denies trouble speaking. Overall she denies any weakness associated with her back pain or the numbness in her upper left extremity. She denies history of TIA or stroke. Review of Systems   Constitutional: Negative for activity change, appetite change, chills, fatigue and fever. HENT: Negative for congestion, ear discharge, ear pain, rhinorrhea and sore throat. Eyes: Negative for pain, discharge, redness, itching and visual disturbance. Respiratory: Negative for apnea, cough, chest tightness, shortness of breath and wheezing. Cardiovascular: Positive for leg swelling. Negative for chest pain and palpitations. Gastrointestinal: Negative for abdominal distention, abdominal pain, anal bleeding, diarrhea, nausea and vomiting. Genitourinary: Negative for difficulty urinating, dysuria, flank pain, frequency, hematuria, pelvic pain, vaginal bleeding and vaginal discharge. Musculoskeletal: Positive for back pain. Negative for arthralgias, myalgias, neck pain and neck stiffness. Skin: Negative for color change, pallor, rash and wound. Neurological: Positive for numbness. Negative for dizziness, tremors, facial asymmetry, weakness, light-headedness and headaches. Psychiatric/Behavioral: Negative for agitation, behavioral problems, confusion, self-injury and suicidal ideas. The patient is not nervous/anxious. All other systems reviewed and are negative.       Past Medical History:   Diagnosis Date    Anemia     Depression     anxiety, pt reports started after son was killed        Past Surgical History:   Procedure Laterality Date    BREAST BIOPSY Bilateral UNKNOWN    ORTHOPEDIC SURGERY      right wrist surgery    TUBAL LIGATION  1988     THYROID BIOPSY  3/22/2018    US THYROID BIOPSY [address] [facility] AMB HISTORICAL        Family History   Problem Relation Age of Onset    Diabetes Mother     Hypertension Mother     Cancer Father         prostate    Breast Cancer Maternal Cousin     Ovarian Cancer Neg Hx     Uterine Cancer Neg Hx     Colon Cancer Neg Hx            Social Connections:     Frequency of Communication with Friends and Family: Not on file    Frequency of Social Gatherings with Friends and Family: Not on file    Attends [employer] Services: Not on file    Active Member of Clubs or Organizations: Not on file    Attends Club or Organization Meetings: Not on file    Marital Status: Not on file        No Known Allergies     Vitals signs and nursing note reviewed. Patient Vitals for the past 4 hrs:   Temp Pulse Resp BP SpO2   06/30/22 0630 98.7 °F (37.1 °C) 93 16 (!) 152/89 98 %          Physical Exam  Vitals and nursing note reviewed. Constitutional:       General: She is not in acute distress. Appearance: Normal appearance. She is normal weight. She is not ill-appearing or toxic-appearing. Comments: Generally well-appearing, alert and oriented x4. No acute distress, speaks in clear, fluid sentences. Tearful at times on exam   HENT:      Head: Normocephalic and atraumatic. Right Ear: External ear normal.      Left Ear: External ear normal.      Nose: Nose normal.      Mouth/Throat:      Mouth: Mucous membranes are moist.   Eyes:      General: No scleral icterus. Right eye: No discharge. Left eye: No discharge. Extraocular Movements: Extraocular movements intact. Cardiovascular:      Rate and Rhythm: Normal rate and regular rhythm. Pulses: Normal pulses. Heart sounds: Normal heart sounds. Pulmonary:      Effort: Pulmonary effort is normal. No respiratory distress.    Abdominal:      General: Abdomen is flat. There is no distension. Palpations: There is no mass. Tenderness: There is abdominal tenderness in the left upper quadrant. There is no right CVA tenderness, left CVA tenderness, guarding or rebound. Negative signs include Lopez's sign and McBurney's sign. Hernia: No hernia is present. Comments: Minimal left-sided CVA tenderness, there is reproducible pain over the left upper quadrant. Keyanna Nasuti sign is negative, no pain at McBurney's point. Musculoskeletal:         General: No swelling, tenderness or deformity. Normal range of motion. Cervical back: Normal range of motion. Skin:     General: Skin is warm. Capillary Refill: Capillary refill takes less than 2 seconds. Neurological:      General: No focal deficit present. Mental Status: She is alert. GCS: GCS eye subscore is 4. GCS verbal subscore is 5. GCS motor subscore is 6. Sensory: Sensory deficit present. Motor: Motor function is intact. Coordination: Coordination is intact. Comments: Subjective numbness over the mid and lower face on the left. There is also subjective numbness over the left arm from shoulder to hand.  strengths are equal bilaterally. No pronator drift or significant ataxia is appreciated. Psychiatric:         Mood and Affect: Mood normal.          Procedures      Labs Reviewed   POCT PT/INR - Abnormal; Notable for the following components:       Result Value    POC Protime 12.1 (*)     All other components within normal limits   CBC WITH AUTO DIFFERENTIAL   COMPREHENSIVE METABOLIC PANEL   PROTIME-INR   TROPONIN   LIPASE   URINALYSIS   POCT GLUCOSE   POCT GLUCOSE        CT HEAD WO CONTRAST   Final Result   No acute process. XR CHEST PORTABLE    (Results Pending)   XR LUMBAR SPINE (2-3 VIEWS)    (Results Pending)        NIH Stroke Scale  Interval: Baseline  Level of Consciousness (1a): Alert  LOC Questions (1b):  Answers both correctly  LOC Commands (1c): Performs both tasks correctly  Best Gaze (2): Normal  Visual (3): No visual loss  Facial Palsy (4): Normal symmetrical movement  Motor Arm, Left (5a): No drift  Motor Arm, Right (5b): No drift  Motor Leg, Left (6a): No drift  Motor Leg, Right (6b): No drift  Limb Ataxia (7): Absent  Sensory (8): (!) Mild to Moderate  Best Language (9): No aphasia  Dysarthria (10): Normal  Extinction and Inattention (11): No abnormality  Total: 1           ED Course as of 07/02/22 2311   Thu Jun 30, 2022   0712 NIH of 1 per neuro, no TPA [BR]      ED Course User Index  [BR] [address] Esteban [facility],         Voice dictation software was used during the making of this note. This software is not perfect and grammatical and other typographical errors may be present. This note has not been completely proofread for errors.      [address] Orellana Herbie [facility], DO  06/30/22 [address],   07/02/22 2311

## 2022-06-30 NOTE — ACP (ADVANCE CARE PLANNING)
Advance Care Planning   Healthcare Decision Maker:    Primary Decision Maker: Arielle Casiano - [phone]    Click here to complete Healthcare Decision Makers including selection of the Healthcare Decision Maker Relationship (ie \"Primary\"). Today we documented Decision Maker(s) consistent with Legal Next of Kin hierarchy. Pt made aware that hospital  can be contacted to complete HCPOA/LW, paperwork provided to pt.

## 2022-06-30 NOTE — THERAPY EVALUATION
PHYSICAL THERAPY Initial Assessment  (Link to Caseload Tracking:    Acknowledge Orders  Time In/Out  PT Charge Capture  Rehab Caseload Tracker    Sarah Crawford is a 62 y.o. female   PRIMARY DIAGNOSIS: Acute cerebrovascular accident (CVA) ([facility] [address]  Left sided numbness [R20.0]  Back strain, initial encounter [S39.012A]  Acute cerebrovascular accident (CVA) ([address] [I63.9]       Reason for Referral: Acute cerebrovascular accident (CVA) [facility])  Inpatient: Payor: Rojas Torres / Plan: GABBY [facility] / Product Type: *No Product type* /     ASSESSMENT:     REHAB RECOMMENDATIONS:   Recommendation to date pending progress:  [address] [place] [address]    Equipment:     None     ASSESSMENT:  Ms. Komal Foster is a 61 yo female who was admitted to the ED w/ an acute CVA. 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 Pt PLOF is independent, pt was able to ambulate today 76' no AD w/ CGA. Pt had no LOB/SOB episodes when EOB, pt did show signs of deconditioning and fatigue when EOB. PT D/C reccommendations would be home health due to the pt living alone. Pt would benefit from skilled PT in order to address her remaining impairments.       [address] [facility] [address] [address] AM-PAC 6 Clicks Basic Mobility Inpatient Short Form  AM-PAC Mobility Inpatient   How much difficulty turning over in bed?: None  How much difficulty sitting down on / standing up from a chair with arms?: None  How much difficulty moving from lying on back to sitting on side of bed?: None  How much help from another person moving to and from a bed to a chair?: None  How much help from another person needed to walk in hospital room?: A Little  How much help from another person for climbing 3-5 steps with a railing?: A Little  AM-[facility] Inpatient Mobility Raw Score : 22  AM-PAC Inpatient T-Scale Score : 53.28  Mobility Inpatient CMS 0-100% Score: 20.91  Mobility Inpatient CMS G-Code Modifier : CJ    SUBJECTIVE:   Ms. Komal Foster states, \"that she feels tired, but is willing to work\"     Social/Functional Lives With: Alone  Type of Home: House  Home Layout: One level  Home Access: Stairs to enter with rails  Entrance Stairs - Number of Steps: 1  Bathroom Toilet: Standard  Home Equipment:  (none)  Receives Help From: Friend(s)  ADL Assistance: Independent  Homemaking Assistance: Independent  Ambulation Assistance: Independent  Transfer Assistance: Independent  Mode of Transportation: Car  Occupation: Full time employment    OBJECTIVE:     PAIN: VITALS / O2: PRECAUTION / Dwana Laughter / Castillo Curly:   Pre Treatment:   Pain Assessment: 0-10  Pain Level: 6  Pain Location: Back  Pain Orientation: Posterior  Pain Descriptors: Stabbing      Post Treatment: 4 Vitals   Vitals  Resp: 18    Oxygen  Resp: 18   None    RESTRICTIONS/PRECAUTIONS:  Restrictions/Precautions: None                 GROSS EVALUATION: Intact Impaired (Comments):   AROM [x]     PROM [x]    Strength [x]     Balance [x]     Posture [x] Forward Head  Rounded Shoulders  Thoracic Kyphosis   Sensation [x]     Coordination [x]      Tone [x]     Edema [x]    Activity Tolerance [x]  pt presented w/ fatigue when EOB     []      COGNITION/  PERCEPTION: Intact Impaired (Comments):   Orientation [x]     Vision [x]     Hearing [x]     Cognition  [x]       MOBILITY: I Mod I S SBA CGA Min Mod Max Total  NT x2 Comments:   Bed Mobility    Rolling [x] [] [] [] [] [] [] [] [] [] []    Supine to Sit [x] [] [] [] [] [] [] [] [] [] []    Scooting [x] [] [] [] [] [] [] [] [] [] []    Sit to Supine [x] [] [] [] [] [] [] [] [] [] []    Transfers    Sit to Stand [] [] [] [] [x] [] [] [] [] [] []    Bed to Chair [] [] [] [] [x] [] [] [] [] [] []    Stand to Sit [] [] [] [] [x] [] [] [] [] [] []     [] [] [] [] [] [] [] [] [] [] []    I=Independent, Mod I=Modified Independent, S=Supervision, SBA=Standby Assistance, CGA=Contact Guard Assistance,   Min=Minimal Assistance, Mod=Moderate Assistance, Max=Maximal Assistance, Total=Total Assistance, NT=Not Tested    GAIT: I Mod I S SBA CGA Min Mod Max Total  NT x2 Comments:   Level of Assistance [] [] [] [] [x] [] [] [] [] [] []    Distance 75 feet    DME None    Gait Quality Decreased megan , Decreased step clearance, Decreased step length and Decreased stance    Weightbearing Status Restrictions/Precautions  Restrictions/Precautions: None    Stairs Stairs/Curb  Stairs?: No    I=Independent, Mod I=Modified Independent, S=Supervision, SBA=Standby Assistance, CGA=Contact Guard Assistance,   Min=Minimal Assistance, Mod=Moderate Assistance, Max=Maximal Assistance, Total=Total Assistance, NT=Not Tested    PLAN:   ACUTE PHYSICAL THERAPY GOALS:   (Developed with and agreed upon by patient and/or caregiver.)  STG:     (2.)Ms. Haile North will transfer from bed to chair and chair to bed with INDEPENDENT using the least restrictive device within 10 treatment day(s). (3.)Ms. Haile North will ambulate with INDEPENDENT for 150 feet with the least restrictive device within 10 treatment day(s). LTG:      (2.)Ms. Haile North will transfer from bed to chair and chair to bed with INDEPENDENCE using the least restrictive device within 21 treatment day(s). (3.)Ms. Haile North will ambulate with INDEPENDENCE for 300 feet with the least restrictive device within 21 treatment day(s).   ________________________________________________________________________________________________    FREQUENCY AND DURATION: 3 times/week for duration of hospital stay or until stated goals are met, whichever comes first.    THERAPY PROGNOSIS: Good    PROBLEM LIST:   (Skilled intervention is medically necessary to address:)  Decreased Activity Tolerance  Decreased Strength  Increased Pain INTERVENTIONS PLANNED:   (Benefits and precautions of physical therapy have been discussed with the patient.)  Therapeutic Activity  Therapeutic Exercise/HEP  Neuromuscular Re-education  Gait Training  Manual Therapy  Modalities       TREATMENT:   EVALUATION: MODERATE COMPLEXITY: (Untimed Charge)    TREATMENT:   Therapeutic Activity (15 Minutes): Therapeutic activity included Rolling, Supine to Sit, Sit to Supine, Scooting, Lateral Scooting, Transfer Training, Ambulation on level ground, Stair Training, Sitting balance  and Standing balance to improve functional Activity tolerance, Balance, Coordination, Mobility, Strength and ROM.     TREATMENT GRID:   Date:  6/30/22   Date:   Date:     Activity/Exercise Parameters Parameters Parameters   Pt ed on PT POC, biomechanics, sequencing  10 mins      Sit to stands  2 x 5      Ambulation CGA no AD  1 x 75'                                   AFTER TREATMENT PRECAUTIONS: Visitors at bedside    INTERDISCIPLINARY COLLABORATION:  n/a    EDUCATION: Education Given To: Patient  Education Provided: Role of Therapy;Plan of Care;Transfer Training;Energy Conservation    TIME IN/OUT:  Time In: 1418  Time Out: [facility]  Minutes: [facility], PT

## 2022-07-01 ENCOUNTER — APPOINTMENT (OUTPATIENT)
Dept: MRI IMAGING | Age: 57
End: 2022-07-01
Payer: COMMERCIAL

## 2022-07-01 VITALS
BODY MASS INDEX: 28.66 KG/M2 | SYSTOLIC BLOOD PRESSURE: 126 MMHG | OXYGEN SATURATION: 98 % | HEIGHT: 66 IN | HEART RATE: 91 BPM | DIASTOLIC BLOOD PRESSURE: 83 MMHG | TEMPERATURE: 98.3 F | WEIGHT: 178.35 LBS | RESPIRATION RATE: 16 BRPM

## 2022-07-01 PROBLEM — R20.0 LEFT ARM NUMBNESS: Status: ACTIVE | Noted: 2022-06-30

## 2022-07-01 LAB
CHOLEST SERPL-MCNC: 150 MG/DL
ERYTHROCYTE [DISTWIDTH] IN BLOOD BY AUTOMATED COUNT: 14.1 % (ref 11.9–14.6)
EST. AVERAGE GLUCOSE BLD GHB EST-MCNC: 126 MG/DL
HBA1C MFR BLD: 6 % (ref 4.2–6.3)
HCT VFR BLD AUTO: 35.3 % (ref 35.8–46.3)
HDLC SERPL-MCNC: 67 MG/DL (ref 40–60)
HDLC SERPL: 2.2 {RATIO}
HGB BLD-MCNC: 11.4 G/DL (ref 11.7–15.4)
LDLC SERPL CALC-MCNC: 72.4 MG/DL
MCH RBC QN AUTO: 27.3 PG (ref 26.1–32.9)
MCHC RBC AUTO-ENTMCNC: 32.3 G/DL (ref 31.4–35)
MCV RBC AUTO: 84.7 FL (ref 79.6–97.8)
NRBC # BLD: 0 K/UL (ref 0–0.2)
PLATELET # BLD AUTO: 280 K/UL (ref 150–450)
PMV BLD AUTO: 10.6 FL (ref 9.4–12.3)
RBC # BLD AUTO: 4.17 M/UL (ref 4.05–5.2)
TRIGL SERPL-MCNC: 53 MG/DL (ref 35–150)
VLDLC SERPL CALC-MCNC: 10.6 MG/DL (ref 6–23)
WBC # BLD AUTO: 7.9 K/UL (ref 4.3–11.1)

## 2022-07-01 PROCEDURE — 6370000000 HC RX 637 (ALT 250 FOR IP): Performed by: INTERNAL MEDICINE

## 2022-07-01 PROCEDURE — 83036 HEMOGLOBIN GLYCOSYLATED A1C: CPT

## 2022-07-01 PROCEDURE — 97165 OT EVAL LOW COMPLEX 30 MIN: CPT

## 2022-07-01 PROCEDURE — 96368 THER/DIAG CONCURRENT INF: CPT

## 2022-07-01 PROCEDURE — 96372 THER/PROPH/DIAG INJ SC/IM: CPT

## 2022-07-01 PROCEDURE — 99231 SBSQ HOSP IP/OBS SF/LOW 25: CPT | Performed by: PSYCHIATRY & NEUROLOGY

## 2022-07-01 PROCEDURE — 85027 COMPLETE CBC AUTOMATED: CPT

## 2022-07-01 PROCEDURE — G0378 HOSPITAL OBSERVATION PER HR: HCPCS

## 2022-07-01 PROCEDURE — 72148 MRI LUMBAR SPINE W/O DYE: CPT

## 2022-07-01 PROCEDURE — 6360000002 HC RX W HCPCS: Performed by: INTERNAL MEDICINE

## 2022-07-01 PROCEDURE — 80061 LIPID PANEL: CPT

## 2022-07-01 PROCEDURE — 97530 THERAPEUTIC ACTIVITIES: CPT

## 2022-07-01 PROCEDURE — 92610 EVALUATE SWALLOWING FUNCTION: CPT

## 2022-07-01 PROCEDURE — 96365 THER/PROPH/DIAG IV INF INIT: CPT

## 2022-07-01 PROCEDURE — 70551 MRI BRAIN STEM W/O DYE: CPT

## 2022-07-01 PROCEDURE — 36415 COLL VENOUS BLD VENIPUNCTURE: CPT

## 2022-07-01 PROCEDURE — 96366 THER/PROPH/DIAG IV INF ADDON: CPT

## 2022-07-01 RX ORDER — MIDAZOLAM HYDROCHLORIDE 2 MG/2ML
1 INJECTION, SOLUTION INTRAMUSCULAR; INTRAVENOUS ONCE
Status: DISCONTINUED | OUTPATIENT
Start: 2022-07-01 | End: 2022-07-01

## 2022-07-01 RX ORDER — SIMVASTATIN 10 MG
10 TABLET ORAL NIGHTLY
Qty: 30 TABLET | Refills: 1 | Status: SHIPPED | OUTPATIENT
Start: 2022-07-01

## 2022-07-01 RX ORDER — ACETAMINOPHEN 325 MG/1
650 TABLET ORAL EVERY 4 HOURS PRN
Status: DISCONTINUED | OUTPATIENT
Start: 2022-07-01 | End: 2022-07-01 | Stop reason: HOSPADM

## 2022-07-01 RX ORDER — DIAZEPAM 5 MG/ML
5 INJECTION, SOLUTION INTRAMUSCULAR; INTRAVENOUS EVERY 4 HOURS PRN
Status: DISCONTINUED | OUTPATIENT
Start: 2022-07-01 | End: 2022-07-01 | Stop reason: HOSPADM

## 2022-07-01 RX ORDER — ACETAMINOPHEN 325 MG/1
650 TABLET ORAL EVERY 6 HOURS PRN
Status: DISCONTINUED | OUTPATIENT
Start: 2022-07-01 | End: 2022-07-01

## 2022-07-01 RX ORDER — ASPIRIN 81 MG/1
81 TABLET ORAL DAILY
Qty: 90 TABLET | Refills: 1 | Status: SHIPPED | OUTPATIENT
Start: 2022-07-02

## 2022-07-01 RX ADMIN — OXYCODONE 5 MG: 5 TABLET ORAL at 04:19

## 2022-07-01 RX ADMIN — FLUOXETINE 20 MG: 20 CAPSULE ORAL at 08:05

## 2022-07-01 RX ADMIN — ACETAMINOPHEN 650 MG: 325 TABLET ORAL at 08:04

## 2022-07-01 RX ADMIN — LORAZEPAM 1 MG: 1 TABLET ORAL at 08:33

## 2022-07-01 RX ADMIN — ASPIRIN 81 MG: 81 TABLET ORAL at 08:04

## 2022-07-01 RX ADMIN — ENOXAPARIN SODIUM 40 MG: 40 INJECTION SUBCUTANEOUS at 08:04

## 2022-07-01 RX ADMIN — DIAZEPAM 5 MG: 10 INJECTION, SOLUTION INTRAMUSCULAR; INTRAVENOUS at 09:36

## 2022-07-01 ASSESSMENT — PAIN SCALES - GENERAL
PAINLEVEL_OUTOF10: 3
PAINLEVEL_OUTOF10: 7
PAINLEVEL_OUTOF10: 0
PAINLEVEL_OUTOF10: 0

## 2022-07-01 ASSESSMENT — PAIN DESCRIPTION - DESCRIPTORS
DESCRIPTORS: ACHING
DESCRIPTORS: ACHING

## 2022-07-01 ASSESSMENT — PAIN DESCRIPTION - LOCATION
LOCATION: BACK
LOCATION: HEAD

## 2022-07-01 ASSESSMENT — PAIN DESCRIPTION - ORIENTATION
ORIENTATION: MID
ORIENTATION: ANTERIOR

## 2022-07-01 NOTE — PROGRESS NOTES
Pt is for discharge home today with no needs/supportive care orders received for CM at this time. Pt will have close follow up at the with PCP    Milestones met    ASSESSMENT NOTE    Attending Physician: Modesta Gomez MD  Admit Problem: Left sided numbness [R20.0]  Back strain, initial encounter [Q65.426J]  Acute cerebrovascular accident (CVA) ([address] [I63.9]  Left arm numbness [R20.0]  Date/Time of Admission: 6/30/2022  6:32 AM  Problem List:  Patient Active Problem List   Diagnosis    PMB (postmenopausal bleeding)    Zenker's diverticulum    Menopausal symptoms    Dyspareunia in female    Left arm numbness       Service Assessment  Patient Orientation Alert and [address]   History Provided By Patient   Primary Caregiver Self   Accompanied By/Relationship none   Support Systems Family Members,Sikhism/Tiffany Community   Patient's Healthcare Decision Maker is: Legal Next of Ashlyn 69   PCP Verified by CM Yes (Dr Jenna Martins)   Last Visit to PCP Within last year   Prior Functional Level Independent in ADLs/IADLs   Current Functional Level Independent in ADLs/IADLs   Can patient return to prior living arrangement Yes   Ability to make needs known: Good   Family able to assist with home care needs: Yes   Would you like for me to discuss the discharge plan with any other family members/significant others, and if so, who?  No   Financial Resources     Community Resources     CM/SW Referral Other (see comment) (dispo)     Social/Functional History  Lives With Alone   Type of [facility] One level   Home Access Stairs to enter with rails   Entrance Stairs - Number of Steps 1   Entrance Stairs - Rails     Bathroom Shower/Tub Walk-in shower   [facility]  (none)   Receives Help From Friend(s)   ADL Assistance Independent   Bath     Dressing     Grooming     Feeding     Toileting [address] Work     Driving     Shopping          Other (Comment)     [address] Paying/Finance Responsibility     [address] [employer]     Other (Comment)     Ambuation Assistance Independent   Transfer Assisstance Independent   Active      Patient's  Info     Mode of Transportation Car   Education     Occupation Full time employment   Type of Occupation       Discharge Planning   Type of [address] Family Members,Oriental orthodox/Tiffany Community   Current Services Prior To Admission None   Potential Assistance Needed N/A   DME     DME     DME Ordered? No   Potential Assistance Purchasing Medications No   Meds-to-Beds: Does the patient want to have any new prescriptions delivered to bedside prior to discharge? Type of Home Care Services None   Patient expects to be discharged to: House   Follow Up Appointment: Best Day/Time Wednesday AM   One/Two Story Residence: One story   # of Interior Steps     Height of Each Step (in)     [employer] Available     History of Falls? No     Services At/After Discharge  Transition of Care Consult (CM Consult): Internal Home Health     Internal Hospice     Reason Outside Agency [address]     Partner SNF     Reason Why Partner SNF Not Chosen     Internal Comfort Care     Reason Outside [address] Discharge     [address] Provided?  No   Mode of Transport at Discharge [facility] Time of Discharge     Confirm Follow Up Transport       Condition of Participation: Discharge Planning  The plan for Transition of Care is related to the following treatment goals: no further needs   The Patient and/or Patient Representative was provided with a Choice of Provider? Patient   Name of the Patient Representative who was provided with the Choice of Provider and agrees with the Discharge Plan? The Patient and/or Patient Representative Agree with the Discharge Plan? Yes   Freedom of Choice list was provided with basic dialogue that supports the individualized plan of care/goals, treatment preferences, and shares the quality data associated with the providers?  Yes     Documentation for Discharge Appeal  Discharge Appealed by     Date notified by QIO of appeal request:     Time notified by QIO of appeal request:     Detailed Notice of Discharge given to:     Date Notice of Discharge given:     Time Notice of Discharge given:     Date records sent to [address]     Time records sent to [address]     Date Notified of Outcome     Time Notified of Outcome     Outcome of appeal           Brigitte Taylor RN 07/01/22 12:34 PM

## 2022-07-01 NOTE — PROGRESS NOTES
PT note:    Pt off floor for MRI. Will follow up with pt later as schedule/time allows.      Harley Giraldo, PTA

## 2022-07-01 NOTE — PROGRESS NOTES
OCCUPATIONAL THERAPY Initial Assessment and Discharge          Acknowledge Orders  Time  OT Charge Capture  Rehab Caseload Tracker      Adrián Sun is a 62 y.o. female   PRIMARY DIAGNOSIS: Left arm numbness  Left sided numbness [R20.0]  Back strain, initial encounter [S39.012A]  Acute cerebrovascular accident (CVA) ([facility] [address] [I63.9]  Left arm numbness [R20.0]       Reason for Referral: Generalized Muscle Weakness (M62.81)  Difficulty in walking, Not elsewhere classified (R26.2)  Other abnormalities of gait and mobility (R26.89)  Observation: Payor: PRIYANKA / Plan: GABBY MATHEW [address] / Product Type: *No Product type* /     ASSESSMENT:     REHAB RECOMMENDATIONS:   Recommendation to date pending progress:  Setting:   No further skilled therapy after discharge from hospital    Equipment:     None     ASSESSMENT:  Ms. Krystina Casas presents to the hospital with left arm and face numbness. CT scan showed small 2x3mm aneurysm in MIKE and MRI showed no acute infarction. Pt with chronic back pain and MRI showed multilevel degenerative facet arthropathy. At baseline pt is independent all ADLs, works and drives. Today pt overall independent with ADLs in room. Pt able to perform LE dressing with mod I, shower transfer and toilet transfer independently. Pt already completed dressing and grooming ADLs independently prior to evaluation. Pt does walk slowly and cautiously due to chronic back pain, however is still independent with ADLs. Pt does not have acute OT needs or OT needs at d/c. Will defer to PT for higher level balance.       [address] [facility] [address] [address] AM-PAC 6 Clicks Daily Activity Inpatient Short Form:    AM-PAC Daily Activity Inpatient   How much help for putting on and taking off regular lower body clothing?: None  How much help for Bathing?: None  How much help for Toileting?: None  How much help for putting on and taking off regular upper body clothing?: None  How much help for taking care of personal grooming?: None  How much help for eating meals?: None  AM-PAC Inpatient Daily Activity Raw Score: 24  AM-PAC Inpatient ADL T-Scale Score : 57.54  ADL Inpatient CMS 0-100% Score: 0  ADL Inpatient CMS G-Code Modifier : CH           SUBJECTIVE:     Ms. Haile North states, \"I work for [employer] Lives With: Alone  Type of Home: House  Home Layout: One level  Home Access: Stairs to enter with rails  Entrance Stairs - Number of Steps: 1  Bathroom Shower/Tub: Walk-in shower  Bathroom Toilet: Standard  Home Equipment:  (none)  Receives Help From: Friend(s)  ADL Assistance: Independent  Homemaking Assistance: Independent  Ambulation Assistance: Independent  Transfer Assistance: Independent  Mode of Transportation: Car  Occupation: Full time employment    OBJECTIVE:     GUY / Shannon Villarreal / Adrianne Rich: None    RESTRICTIONS/PRECAUTIONS:  Restrictions/Precautions: None    PAIN: VITALS / O2:   Pre Treatment:   Pain Assessment: None - Denies Pain      Post Treatment: no c/o pain, resting comfortably in supine       Vitals          Oxygen            GROSS EVALUATION: INTACT IMPAIRED   (See Comments)   UE AROM [x] []   UE PROM [x] []   Strength [x]       Posture / Balance [] Posture: Good  Sitting - Static: Good  Sitting - Dynamic: Good  Standing - Static: Good  Standing - Dynamic: Fair,+   Sensation []  numbness still present in L side of face and slightly in L arm--does not affect fine or gross motor function   Coordination [x]       Tone [x]       Edema [x]    Activity Tolerance [x]       Hand Dominance R [] L []      COGNITION/  PERCEPTION: INTACT IMPAIRED   (See Comments)   Orientation [x]     Vision [x]     Hearing [x]     Cognition  [x]     Perception [x]       MOBILITY: I Mod I S SBA CGA Min Mod Max Total  NT x2 Comments:   Bed Mobility    Rolling [] [] [] [] [] [] [] [] [] [x] []    Supine to Sit [x] [] [] [] [] [] [] [] [] [] []    Scooting [x] [] [] [] [] [] [] [] [] [] []    Sit to Supine [x] [] [] [] [] [] [] [] [] [] []    Transfers    Sit to Stand [x] [] [] [] [] [] [] [] [] [] [] No AD   Bed to Chair [x] [] [] [] [] [] [] [] [] [] [] No AD   Stand to Sit [x] [] [] [] [] [] [] [] [] [] [] No AD   Tub/Shower [x] [] [] [] [] [] [] [] [] [] []  No AD   Toilet [x] [] [] [] [] [] [] [] [] [] []  No AD    [] [] [] [] [] [] [] [] [] [x] []    I=Independent, Mod I=Modified Independent, S=Supervision/Setup, SBA=Standby Assistance, CGA=Contact Guard Assistance, Min=Minimal Assistance, Mod=Moderate Assistance, Max=Maximal Assistance, Total=Total Assistance, NT=Not Tested    ACTIVITIES OF DAILY LIVING: I Mod I S SBA CGA Min Mod Max Total NT Comments: pt reported completing most ADLs prior to evaluation   BASIC ADLs:              Upper Body Bathing  [] [] [] [] [] [] [] [] [] [x]    Lower Body Bathing [] [] [] [] [] [] [] [] [] [x]    Toileting [] [] [] [] [] [] [] [] [] [x]    Upper Body Dressing [] [] [] [] [] [] [] [] [] [x]    Lower Body Dressing [] [x] [] [] [] [] [] [] [] [] Donning/doffing socks EOB   Feeding [] [] [] [] [] [] [] [] [] [x]    Grooming [] [] [] [] [] [] [] [] [] [x]    Personal Device Care [] [] [] [] [] [] [] [] [] [x]    Functional Mobility [x] [] [] [] [] [] [] [] [] [] No AD   I=Independent, Mod I=Modified Independent, S=Supervision/Setup, SBA=Standby Assistance, CGA=Contact Guard Assistance, Min=Minimal Assistance, Mod=Moderate Assistance, Max=Maximal Assistance, Total=Total Assistance, NT=Not Tested    PLAN:     FREQUENCY/DURATION   OT Plan of Care:  (eval & d/c) for duration of hospital stay or until stated goals are met, whichever comes first.    ACUTE OCCUPATIONAL THERAPY GOALS:   (Developed with and agreed upon by patient and/or caregiver.)  No goals, evaluation and d/c.  Pt at functional baseline      PROBLEM LIST:   (Skilled intervention is medically necessary to address:)  Decreased Balance   INTERVENTIONS PLANNED:  (Benefits and precautions of occupational therapy have been discussed with the patient.)  defer to PT for higher level balance activities         TREATMENT:     EVALUATION: LOW COMPLEXITY: (Untimed Charge)    TREATMENT:   N/A, evaluation only    TREATMENT GRID:  N/A    AFTER TREATMENT PRECAUTIONS: Bed, Call light within reach, Needs within reach and RN notified    INTERDISCIPLINARY COLLABORATION:  RN/ PCT and OT/ WAITE    EDUCATION:  Education Given To: Patient  Education Provided: Role of Therapy;Plan of Care  Education Method: Verbal  Barriers to Learning: None  Education Outcome: Verbalized understanding    TOTAL TREATMENT DURATION AND TIME:  Time In: 2515  Time Out: [facility]  Minutes: TAPAN Elias

## 2022-07-01 NOTE — PROGRESS NOTES
PHYSICAL THERAPY Daily Note  (Link to Caseload Tracking: PT Visit Days : 2  Time In/Out PT Charge Capture  Rehab Caseload Tracker  Orders      Cornelio Cochran is a 62 y.o. female   PRIMARY DIAGNOSIS: Left arm numbness  Left sided numbness [R20.0]  Back strain, initial encounter [S39.012A]  Acute cerebrovascular accident (CVA) ([address] [I63.9]  Left arm numbness [R20.0]       Observation: Payor: PRIYANKA / Plan: GABBY MATHEW [facility] / Product Type: *No Product type* /     ASSESSMENT:     REHAB RECOMMENDATIONS:   Recommendation to date pending progress:  Setting:   No further skilled therapy after discharge from hospital    Equipment:     None     ASSESSMENT:  Ms. Abeba Klein was supine in bed asleep on arrival. She went down earlier this morning to have a MRI and RN states she gave her medicine to help her relax. Pt stood and ambulated out into the hallway. She became dizzy from the medicine. Pt returned to room and positioned supine in bed. She is planning to go home today.      SUBJECTIVE:   Ms. Abeba Klein states, \"I thought I was fine from the medicine\"     Social/Functional Lives With: Alone  Type of Home: House  Home Layout: One level  Home Access: Stairs to enter with rails  Entrance Stairs - Number of Steps: 1  Bathroom Shower/Tub: Walk-in shower  Bathroom Toilet: Standard  Home Equipment:  (none)  Receives Help From: Friend(s)  ADL Assistance: Independent  Homemaking Assistance: Independent  Ambulation Assistance: Independent  Transfer Assistance: Independent  Mode of Transportation: Car  Occupation: Full time employment  OBJECTIVE:     PAIN: VITALS / O2: PRECAUTION / Ventura Dunham / Dominik Flood:   Pre Treatment:  none         Post Treatment: none Vitals        Oxygen    None    RESTRICTIONS/PRECAUTIONS:  Restrictions/Precautions  Restrictions/Precautions: None  Restrictions/Precautions: None     MOBILITY: I Mod I S SBA CGA Min Mod Max Total  NT x2 Comments:   Bed Mobility    Rolling [] [] [] [] [] [] [] [] [] [] []    Supine to Sit [x] [] [] [] [] [] [] [] [] [] []    Scooting [x] [] [] [] [] [] [] [] [] [] []    Sit to Supine [x] [] [] [] [] [] [] [] [] [] []    Transfers    Sit to Stand [] [] [] [x] [] [] [] [] [] [] []    Bed to Chair [] [] [] [] [x] [] [] [] [] [] []    Stand to Sit [] [] [] [x] [] [] [] [] [] [] []     [] [] [] [] [] [] [] [] [] [] []    I=Independent, Mod I=Modified Independent, S=Supervision, SBA=Standby Assistance, CGA=Contact Guard Assistance,   Min=Minimal Assistance, Mod=Moderate Assistance, Max=Maximal Assistance, Total=Total Assistance, NT=Not Tested    BALANCE: Good Fair+ Fair Fair- Poor NT Comments   Sitting Static [x] [] [] [] [] []    Sitting Dynamic [] [x] [] [] [] []              Standing Static [] [] [x] [] [] []    Standing Dynamic [] [] [x] [] [] [] Groggy from medicine      GAIT: I Mod I S SBA CGA Min Mod Max Total  NT x2 Comments:   Level of Assistance [] [] [] [] [x] [] [] [] [] [] []    Distance 80 feet    DME None    Gait Quality Decreased step clearance, Decreased step length and Trunk sway increased    Weightbearing Status      Stairs      I=Independent, Mod I=Modified Independent, S=Supervision, SBA=Standby Assistance, CGA=Contact Guard Assistance,   Min=Minimal Assistance, Mod=Moderate Assistance, Max=Maximal Assistance, Total=Total Assistance, NT=Not Tested    PLAN:   ACUTE PHYSICAL THERAPY GOALS:   (Developed with and agreed upon by patient and/or caregiver.)  STG:     (2.)Ms. Carmel Rubio will transfer from bed to chair and chair to bed with INDEPENDENT using the least restrictive device within 10 treatment day(s). (3.)Ms. Carmel Rubio will ambulate with INDEPENDENT for 150 feet with the least restrictive device within 10 treatment day(s).      LTG:      (2.)Ms. Carmel Rubio will transfer from bed to chair and chair to bed with INDEPENDENCE using the least restrictive device within 21 treatment day(s). (3.)Ms. Carmel Rubio will ambulate with INDEPENDENCE for 300 feet with the least restrictive device within 21 treatment day(s). FREQUENCY AND DURATION: 3 times/week for duration of hospital stay or until stated goals are met, whichever comes first.    TREATMENT:   TREATMENT:   Therapeutic Activity (15 Minutes): Therapeutic activity included Supine to Sit, Sit to Supine, Scooting, Ambulation on level ground, Sitting balance  and Standing balance to improve functional Activity tolerance, Balance and Mobility.     TREATMENT GRID:  N/A    AFTER TREATMENT PRECAUTIONS: Bed and Needs within reach    INTERDISCIPLINARY COLLABORATION:  RN/ PCT    EDUCATION:      TIME IN/OUT:  Time In: 1400  Time Out: [address]  Minutes: 1086 Jerri Foster, [facility]

## 2022-07-01 NOTE — PROGRESS NOTES
[facility] [address]  [address], [address] [facility]            Kevin Mares is a 62 y.o. female who presents on referral from the in-hospital service she entered with left-sided numbness. Patient does have substantial stress issues having lost a son      Past Medical History:   Diagnosis Date    Anemia     Depression     anxiety, pt reports started after son was killed       Past Surgical History:   Procedure Laterality Date    BREAST BIOPSY Bilateral UNKNOWN    ORTHOPEDIC SURGERY      right wrist surgery    TUBAL LIGATION  1988     THYROID BIOPSY  3/22/2018     THYROID BIOPSY [facility] HISTORICAL        Family History   Problem Relation Age of Onset    Diabetes Mother     Hypertension Mother     Cancer Father         prostate    Breast Cancer Maternal Cousin     Ovarian Cancer Neg Hx     Uterine Cancer Neg Hx     Colon Cancer Neg Hx         Social History     Socioeconomic History    Marital status: Single     Spouse name: Not on file    Number of children: Not on file    Years of education: Not on file    Highest education level: Not on file   Occupational History    Not on file   Tobacco Use    Smoking status: Never Smoker    Smokeless tobacco: Never Used   Substance and Sexual Activity    Alcohol use: Yes    Drug use: No    Sexual activity: Not on file   Other Topics Concern    Not on file   Social History Narrative    Abuse: Feels safe at home, no history of physical abuse, no history of sexual abuse       Social Determinants of Health     Financial Resource Strain:     Difficulty of Paying Living Expenses: Not on file   Food Insecurity:     Worried About Running Out of Food in the Last Year: Not on file    Amy of Food in the Last Year: Not on file   Transportation Needs:     Lack of Transportation (Medical): Not on file    Lack of Transportation (Non-Medical):  Not on file   Physical Activity:     Days of Exercise per Week: Not on file    Minutes of Exercise per Session: Not on file   Stress:     Feeling of Stress : Not on file   Social Connections:     Frequency of Communication with Friends and Family: Not on file    Frequency of Social Gatherings with Friends and Family: Not on file    Attends Tenriism Services: Not on file    Active Member of Clubs or Organizations: Not on file    Attends Club or Organization Meetings: Not on file    Marital Status: Not on file   Intimate Partner Violence:     Fear of Current or Ex-Partner: Not on file    Emotionally Abused: Not on file    Physically Abused: Not on file    Sexually Abused: Not on file   Housing Stability:     Unable to Pay for Housing in the Last Year: Not on file    Number of [place] in the Last Year: Not on file    Unstable Housing in the Last Year: Not on file         Current Facility-Administered Medications   Medication Dose Route Frequency Provider Last Rate Last Admin    acetaminophen (TYLENOL) tablet 650 mg  650 mg Oral Q4H PRN Tania Kirby MD   650 mg at 07/01/22 0804    diazePAM (VALIUM) injection 5 mg  5 mg IntraVENous Q4H PRN Tania Kirby MD   5 mg at 07/01/22 0936    ondansetron (ZOFRAN-ODT) disintegrating tablet 4 mg  4 mg Oral Q8H PRN Viviana Torres MD        Or    [facility] injection 4 mg  4 mg IntraVENous Q6H PRN Viviana Torres MD        polyethylene glycol [facility]) packet 17 g  17 g Oral Daily PRN Viviana Torres MD        enoxaparin (LOVENOX) injection 40 mg  40 mg SubCUTAneous Daily Viviana Torres MD   40 mg at 07/01/22 0804    aspirin EC tablet 81 mg  81 mg Oral Daily Viviana Torres MD   81 mg at 07/01/22 8811    Or    aspirin suppository 300 mg  300 mg Rectal Daily Viviana Torres MD        atorvastatin (LIPITOR) tablet 40 mg  40 mg Oral Nightly Viviana Torres MD   40 mg at 06/30/22 1940    FLUoxetine (PROZAC) capsule 20 mg  20 mg Oral Daily Viviana Torres MD   20 mg changes  Aorta: Conventional 3 vessel arch  Great Vessels: Patent    Right ICA: Probable changes of fibromuscular dysplasia in the cervical ICA. However there is also a focal dissection flap present at the skull base in the  cervical ICA. The clinical significance is uncertain  % Stenosis: 0  Right MCA: Patent  Right SYLWIA: Patent. Small 2 x 3 mm aneurysm of the anterior to indicating artery. The distal vessels are likely patent. Left ICA: Beaded appearance of the cervical ICA is most compatible with changes  of fibromuscular dysplasia.  % Stenosis: 0  Left MCA: Patent  Left SYLWIA: Patent    Right Vertebral: Patent  Left Vertebral: Patent  Dominance: The vessels are small in caliber  Basilar: Patent  Right PCA: Patent. Fetal origin  Left PCA: Patent. Fetal origin    Other Vascular: Negative    Impression  1. 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 2. There are likely changes of bilateral cervical ICA fibromuscular dysplasia,  left greater than right. 3. No significant ICA stenosis is present. 4. 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 5. No obvious acute large vessel intracranial occlusion. Most recent Echo  Results for orders placed during the hospital encounter of 06/30/22    Transthoracic echocardiogram (TTE) complete with contrast, bubble, strain, and 3D PRN    Interpretation Summary    Left Ventricle: Normal left ventricular systolic function with a visually estimated EF of 60 - 65%. Left ventricle size is normal. Normal wall thickness. Normal wall motion. Normal diastolic function.   Interatrial Septum: Grade 0 Absence of bubbles. Agitated saline study was negative with and without provocation.   Technical qualifiers: Color flow Doppler was performed and pulse wave and/or continuous wave Doppler was performed.          Most recent lipid panels  Lab Results   Component Value Date/Time    CHOL 150 07/01/2022 07:38 AM    HDL 67 07/01/2022 07:38 AM       Most recent Hgb A1C  No results found for: HBA1C, [id number]      Assessment/Plan:    MRI negative.   Reasonable to adhere to secondary stroke recommendations with normotension aspirin and secondary lipid targets  Follow-up through PCP is appropriate        Reinaldo Patino MD

## 2022-07-01 NOTE — PROGRESS NOTES
SPEECH LANGUAGE PATHOLOGY: DYSPHAGIA  Initial Assessment    NAME: Lady Higgins  : 1965  MRN: [id number]    ADMISSION DATE: 2022  ADMITTING DIAGNOSIS: has PMB (postmenopausal bleeding); Zenker's diverticulum; Menopausal symptoms; Dyspareunia in female; and Acute cerebrovascular accident (CVA) ([facility] [address] on their problem list.    ICD-10: Treatment Diagnosis: R13.12 Dysphagia, Oropharyngeal Phase    RECOMMENDATIONS   Diet:  Diet Solids Recommendation: Regular  Liquid Consistency Recommendation: Thin    Medications: PO     Recommendations: Self feed   Compensatory Swallowing Strategies:Upright as possible for all oral intake;Remain upright for 30-45 minutes after meals   Therapeutic Intervention:    Patient continues to require skilled intervention: Yes  D/C Recommendations: Ongoing speech therapy is recommended during this hospitalization,Ongoing speech therapy is recommended at next level of care (Pending imaging results/clinical course)     ASSESSMENT    Dysphagia Diagnosis: Swallow function appears [facility]  Patient is consuming regular consistency diet and thin liquids with adequate oral phase of swallow and no pharyngeal dysphagia symptoms identified. No further dysphagia treatment indicated. Will plan for cognitive linguistic assessment next session if indicated by imaging results/clinical course. Can be completed at next level of care if discharged. GENERAL    History of Present Injury/Illness: Ms. Fritz Corrales  has a past medical history of Anemia and Depression. . She also  has a past surgical history that includes Tubal ligation (); orthopedic surgery; Breast biopsy (Bilateral, UNKNOWN); and US BIOPSY THYROID (3/22/2018). Chart Reviewed: Yes  Subjective: Friendly and pleasant. Behavior/Cognition: Alert; Cooperative;Pleasant mood  Communication Observation: Functional  Follows Directions: Simple  Respiratory Status: Room air              Prior Dysphagia History: Esophagram 2018- reveal mild esophageal dysmotility and small Zenker's diverticulum. Patient reports only occasional globus sensation with solids. Current Diet : Regular  Current Liquid Diet : Thin  Pain:   Patient does not c/o pain                                        OBJECTIVE    Patient Positioning: Upright in bed   Oral Motor   Labial: No impairment  Dentition: Intact; Natural  Oral Hygiene: Moist;Clean  Lingual: No impairment  Velum: No Impairment  Mandible: No impairment  Dentition: Adequate        Baseline Vocal Quality: Normal  Oropharyngeal Phase:    Patient presented with thin liquid via cup and straw, and mixed consistency fruit. Politely declined cracker. Appropriate oral prep with all textures. Timely swallow initiation, and single swallows upon palpation. Adequate oral clearing. No overt signs or symptoms of airway compromise observed with liquid or solid textures. PLAN    Duration/Frequency: SLP will follow up for cognitive linguistic assessment if indicated by imaging or course of hospitalization. Frequency/Duration TBD. Dysphagia Outcome and Severity Scale (LISA)  Dysphagia Outcome Severity Scale: Level 7: Normal in all situations  Interpretation of Tool: The Dysphagia Outcome and Severity Scale (LISA) is a simple, easy-to-use, 7-point scale developed to systematically rate the functional severity of dysphagia based on objective assessment and make recommendations for diet level, independence level, and type of nutrition. Normal(7), Functional(6), Mild(5), Mild-Moderate(4), Moderate(3), Moderate-Severe(2), Severe(1)    Speech Therapy Prognosis  Prognosis: Excellent  Prognosis Considerations: Age; Motivation;Previous Level of Function;Participation Level;Medical Diagnosis    Education: Patient  Patient Education: Safe swallowing precautions  Patient Education Response: Verbalizes understanding    Current Medications:   No current facility-administered medications on file prior to encounter. Current Outpatient Medications on File Prior to Encounter   Medication Sig Dispense Refill    FLUoxetine (PROZAC) 20 MG capsule Take 20 mg by mouth daily      LORazepam (ATIVAN) 1 MG tablet Take 1 mg by mouth daily as needed for Anxiety. PRECAUTIONS/ALLERGIES: Patient has no known allergies.    Safety Devices in place: Yes  Type of devices: Left in bed,Call light within reach  Restraints Initially in Place: No    Therapy Time  SLP Individual Minutes  Time In: 0986  Time Out: 4100  Minutes: [address] [place], [place]  7/1/2022 8:55 AM

## 2022-07-01 NOTE — PLAN OF CARE
Problem: Pain  Goal: Verbalizes/displays adequate comfort level or baseline comfort level  7/1/2022 1512 by Anne-Marie Bullock RN  Outcome: Completed  7/1/2022 0743 by Anne-Marie Bullock RN  Outcome: Progressing     Problem: Safety - Adult  Goal: Free from fall injury  7/1/2022 1512 by Anne-Marie Bullock RN  Outcome: Completed  Flowsheets (Taken 7/1/2022 0747)  Free From Fall Injury: Instruct family/caregiver on patient safety  7/1/2022 0743 by Anne-Marie Bullock RN  Outcome: Progressing

## 2022-07-01 NOTE — DISCHARGE SUMMARY
Hospitalist Discharge Summary   Admit Date:  2022  6:32 AM   DC Note date: 2022  Name:  Tasha Kelsey   Age:  62 y.o. Sex:  female  :  1965   MRN:  [id number]   Room:  [facility]  PCP:  Jesse Haskins MD    Presenting Complaint: Back Pain and Facial Pain     Initial Admission Diagnosis: Left sided numbness [R20.0]  Back strain, initial encounter [I64.481M]  Acute cerebrovascular accident (CVA) ([address] [I63.9]  Left arm numbness [R20.0]     Problem List for this Hospitalization (present on admission):    Principal Problem:    Left arm numbness  Resolved Problems:    * No resolved hospital problems. *    Did Patient have Sepsis (YES OR NO): no    Hospital Course:  49-year-old female with past medical history of depression, anxiety, presents in setting of left facial numbness and left upper extremity numbness. The patient states that she was in her usual state of health until this morning around 7 AM when she woke up and presented with left-sided facial numbness associated with left upper extremity numbness. Her symptoms did not improve so she decided to come to the emergency department. Otherwise she denies any visual disturbances, no headaches, no facial droop, no slurred speech, no upper or lower extremity weakness. In the emergency department code S was started,CT of the brain without acute intracranial abnormalities. Teleneurology was consulted and recommended admission for MRI. The patient will be admitted to the medical floors for further management. Admitted for stroke rule out. Neurology consulted. MRI brain no stroke. MRI spine showed some degenerative changes but nothing acute. Does report some lingering numbness left forearm and face though improved from admit. Patient does have substantial stress issues having lost a son per neurology notes. Unclear if psychosomatic. Neurology said reasonable to start on ASA/statin.   LDL is very close to goal so she does not need high intensity umer with risk of side effects including liver and kidney damage from rhabdo. Lifestyle changes could be pursued as outpatient also. Disposition: home  Diet: ADULT DIET; Regular; Low Sodium (2 gm)  Code Status: Full Code    Follow Ups:   Follow-up Information     WENDY Duran In 1 month. Specialty: Neurology  Contact information:  [address]  [phone]                       Follow up labs/diagnostics (ultimately defer to outpatient provider):  [facility]      Time spent in patient discharge and coordination 32 minutes. Plan was discussed with pt. All questions answered. Patient was stable at time of discharge. Instructions given to call a physician or return if any concerns. Current Discharge Medication List      START taking these medications    Details   aspirin 81 MG EC tablet Take 1 tablet by mouth daily  Qty: 90 tablet, Refills: 1      simvastatin (ZOCOR) 10 MG tablet Take 1 tablet by mouth nightly  Qty: 30 tablet, Refills: 1         CONTINUE these medications which have NOT CHANGED    Details   FLUoxetine (PROZAC) 20 MG capsule Take 20 mg by mouth daily      LORazepam (ATIVAN) 1 MG tablet Take 1 mg by mouth daily as needed for Anxiety. Procedures done this admission:  * No surgery found *    Consults this admission:  IP CONSULT TO STROKE COORDINATOR  IP CONSULT TO NEUROLOGY    Echocardiogram results:  06/30/22    TRANSTHORACIC ECHOCARDIOGRAM (TTE) COMPLETE (CONTRAST/BUBBLE/3D PRN) 06/30/2022  3:44 PM (Final)    Interpretation Summary    Left Ventricle: Normal left ventricular systolic function with a visually estimated EF of 60 - 65%. Left ventricle size is normal. Normal wall thickness. Normal wall motion. Normal diastolic function.   Interatrial Septum: Grade 0 Absence of bubbles. Agitated saline study was negative with and without provocation.     Technical qualifiers: Color flow Doppler was performed and pulse wave and/or continuous wave Doppler was performed. Signed by: Keiry Ceballos MD on 6/30/2022  3:44 PM      Diagnostic Imaging/Tests:   XR LUMBAR SPINE (2-3 VIEWS)    Result Date: 6/30/2022  History: Low back pain EXAM: Lumbar spine series FINDINGS: AP, lateral, and cone-down lateral views lumbar spine demonstrate multilevel facet arthropathy. There is preservation of vertebral body height and alignment. Pedicles are intact. The SI joints are patent. Multilevel lumbar spondylosis. No acute findings. CT HEAD WO CONTRAST    Result Date: 6/30/2022  EXAM: Noncontrast CT head. INDICATION: Left facial numbness. COMPARISON: None. TECHNIQUE: Noncontrast CT images of the head were obtained. Radiation dose reduction techniques were used for this study. Our CT scanners use one or all of the following:  Automated exposure control, adjustment of the mA or kV according to patient size, iterative reconstruction. FINDINGS: Brain volume is appropriate for age. No acute infarct, hemorrhage or mass is identified. There is no mass effect, midline shift or depressed fracture. The visualized paranasal sinuses and mastoid air cells are clear. No acute process. MRI LUMBAR SPINE WO CONTRAST    Result Date: 7/1/2022  MR OF THE LUMBAR SPINE WITHOUT CONTRAST. Clinical Indication: Lower back pain Procedure: Multiplanar and multisequence MR images are performed of the lumbar spine without gadolinium contrast. Comparison: MRI of the lumbar spine dated 6/18/2021 Findings: The vertebral bodies are normal in height and signal. There is no compression fracture. No aggressive bone lesion noted. The disc spaces are maintained. The marrow signal in the sacrum is normal. The conus medullaris is normal in morphology and signal terminating in the expected position at T12-L1. Axial images: L1-L2: No central stenosis or foramina narrowing. L2-L3: Mild degenerative facet arthropathy is present.  There is no central stenosis or foramina narrowing. L3-L4: No central stenosis or foramina narrowing. L4-L5: Bilateral degenerative facet arthropathy is present. There is no central stenosis or foramina narrowing. L5-S1: Bilateral degenerative facet changes are present. There is no central stenosis or foramina narrowing. Limited evaluation the paraspinal soft tissues is unremarkable. 1. Multilevel degenerative facet arthropathy. 2. No acute musculoskeletal abnormality. No central spinal stenosis or significant foramina narrowing. XR CHEST PORTABLE    Result Date: 6/30/2022  History: Code stroke Exam: portable chest Comparison: None Findings: The lungs are clear. The mediastinal contour and osseous structures are normal. IMPRESSIONs: No acute findings. CTA HEAD NECK W CONTRAST    Result Date: 6/30/2022  Title:  CT arteriogram of the neck and head. Indication: Numbness of the left face and arm. Technique: Axial images of the neck and head were obtained after the uneventful administration of intravenous iodinated contrast media. Contrast was used to best identify the arterial structures. Images were reviewed on a separate, free standing, three-dimensional workstation as per the referring physicians request.  All stenosis percentages derived by comparing the narrowest segment with the distal Internal Carotid Artery luminal diameter, as described in the [employer] Symptomatic Carotid Endarterectomy Trial (NASCET) criteria. All CT scans at this facility are performed using dose reduction/dose modulation techniques, as appropriate the performed exam, including the following: Automated Exposure Control; Adjustment of the mA and/or kV according to patient size (this includes techniques or standardized protocols for targeted exams where dose is matched to indication/reason for exam); and Use of Iterative Reconstruction Technique. Comparison: None. Findings: Lungs: Normal Soft Tissues: Heterogeneous appearance of the thyroid gland.  Small nodules are likely present Cervical Spine: Degenerative changes Aorta: Conventional 3 vessel arch Great Vessels: Patent Right ICA: Probable changes of fibromuscular dysplasia in the cervical ICA. However there is also a focal dissection flap present at the skull base in the cervical ICA. The clinical significance is uncertain % Stenosis: 0 Right MCA: Patent Right SYLWIA: Patent. Small 2 x 3 mm aneurysm of the anterior to indicating artery. The distal vessels are likely patent. Left ICA: Beaded appearance of the cervical ICA is most compatible with changes of fibromuscular dysplasia. % Stenosis: 0 Left MCA: Patent Left SYLWIA: Patent Right Vertebral: Patent Left Vertebral: Patent Dominance: The vessels are small in caliber Basilar: Patent Right PCA: Patent. Fetal origin Left PCA: Patent. Fetal origin Other Vascular: Negative     1. There is a focal small dissection flap at the skull base in the right-sided cervical ICA. 2. There are likely changes of bilateral cervical ICA fibromuscular dysplasia, left greater than right. 3. No significant ICA stenosis is present. 4. 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 5. No obvious acute large vessel intracranial occlusion. MRI BRAIN WO CONTRAST    Result Date: 7/1/2022  MRI brain without contrast History: Left-sided face and arm numbness. Imaging sequences: Sagittal short TR/short TE, axial short TR/short TE, long TR/long TE, FLAIR, gradient recall, diffusion weighted images and ADC mapping. Coronal FLAIR. Imaging was performed on a 1.5 Leana magnet. Comparison: None. Correlation is made to the CT scan of the brain 06/30/2022. Findings: The ventricles are normal in size and configuration. There are no extra-axial fluid collections. Normal flow voids are present within all of the major intracranial vessels. No evidence of intraparenchymal hemorrhage or mass effect is identified. There are no areas of restricted diffusion to suggest an acute or subacute infarction. Numerous punctate foci of T2 prolongation are present within the supratentorial white matter. These are nonspecific findings but would be most compatible with mild chronic small vessel ischemic changes. Similar findings can be seen in those with chronic migraine headaches. The visualized mastoid air cells and paranasal sinuses are well pneumatized and aerated. 1. No evidence of acute infarction. 2. Several punctate foci of T2 hyperintensity within the supratentorial white matter. Please see above. Transthoracic echocardiogram (TTE) complete with contrast, bubble, strain, and 3D PRN    Result Date: 6/30/2022    Left Ventricle: Normal left ventricular systolic function with a visually estimated EF of 60 - 65%. Left ventricle size is normal. Normal wall thickness. Normal wall motion. Normal diastolic function.   Interatrial Septum: Grade 0 Absence of bubbles. Agitated saline study was negative with and without provocation.   Technical qualifiers: Color flow Doppler was performed and pulse wave and/or continuous wave Doppler was performed.          Labs: Results:       BMP, Mg, Phos Recent Labs     06/30/22  0649      K 4.2      CO2 29   BUN 12      CBC Recent Labs     06/30/22  0649 07/01/22  0738   WBC 8.3 7.9   RBC 4.49 4.17   HGB 12.4 11.4*   HCT 38.0 35.3*    280      LFT Recent Labs     06/30/22  0649   ALT 18   GLOB 4.6*      Cardiac Testing No results found for: BNP, CPK, RCK1, CKMB   Coagulation Tests Lab Results   Component Value Date/Time    INR 1.0 06/30/2022 06:49 AM    INR 1.0 06/30/2022 06:49 AM      A1c No results found for: HBA1C   Lipid Panel Lab Results   Component Value Date/Time    CHOL 150 07/01/2022 07:38 AM    HDL 67 07/01/2022 07:38 AM      Thyroid Panel No results found for: TSH, T4, FT4     Most Recent UA No results found for: MUCUS, UCOM       All Labs from Last 24 Hrs:  Recent Results (from the past 24 hour(s))   Transthoracic echocardiogram (TTE) complete with contrast, bubble, strain, and 3D PRN    Collection Time: 06/30/22  3:17 PM   Result Value Ref Range    LA Minor Axis 5.2 cm    LA Major [place] 5.6 cm    LA Area 2C 14.9 cm2    LA Area 4C 17.0 cm2    LA Volume BP 39 22 - 52 mL    LA Diameter 2.9 cm    AV Mean Velocity 1.0 m/s    AV Mean Gradient 5 mmHg    AV VTI 32.7 cm    AV Peak Velocity 1.6 m/s    AV Peak Gradient 11 mmHg    AV Area by VTI 1.8 cm2    AV Area by Peak Velocity 1.6 cm2    Aortic Root 2.6 cm    Ascending Aorta 2.7 cm    IVC Proxmal 1.5 cm    IVSd 0.9 0.6 - 0.9 cm    LVIDd 3.8 (A) 3.9 - 5.3 cm    LVIDs 2.5 cm    LVOT Diameter 1.8 cm    LVOT Mean Gradient 2 mmHg    LVOT VTI 23.7 cm    LVOT Peak Velocity 1.0 m/s    LVOT Peak Gradient 4 mmHg    LVPWd 1.0 (A) 0.6 - 0.9 cm    LV E' Lateral Velocity 13 cm/s    LV E' Septal Velocity 11 cm/s    LVOT Area 2.5 cm2    LVOT SV 60.3 ml    MV E Wave Deceleration Time 188.0 ms    MV A Velocity 1.05 m/s    MV E Velocity 1.08 m/s    RV Basal Dimension 3.2 cm    TAPSE 2.1 1.7 cm    TR Max Velocity 2.63 m/s    TR Peak Gradient 28 mmHg    Body Surface Area 1.93 m2    Fractional Shortening 2D 34 28 - 44 %    LVIDd Index 2.01 cm/m2    LVIDs Index 1.32 cm/m2    LV RWT Ratio 0.53     LV Mass 2D 109.0 67 - 162 g    LV Mass 2D Index 57.7 43 - 95 g/m2    MV E/A 1.03     E/E' Ratio (Averaged) 9.06     E/E' Lateral 8.31     E/E' Septal 9.82     LA Volume Index BP 21 16 - 34 ml/m2    LVOT Stroke Volume Index 31.9 mL/m2    LA Size Index 1.53 cm/m2    LA/AO Root Ratio 1.12     Ao Root Index 1.38 cm/m2    Ascending Aorta Index 1.43 cm/m2    AV Velocity Ratio 0.63     LVOT:AV VTI Index 0.72     CHICHO/BSA VTI 1.0 cm2/m2    CHICHO/BSA Peak Velocity 0.8 cm2/m2    Est. RA Pressure 3 mmHg    RVSP 31 mmHg   CBC    Collection Time: 07/01/22  7:38 AM   Result Value Ref Range    WBC 7.9 4.3 - 11.1 K/uL    RBC 4.17 4.05 - 5.2 M/uL    Hemoglobin 11.4 (L) 11.7 - 15.4 g/dL    Hematocrit 35.3 (L) 35.8 - 46.3 %    MCV 84.7 79.6 - 97.8 FL    MCH 27.3 26.1 - nondistended. Extremities: Warm and dry. No cyanosis or clubbing. No edema. Skin:     No rashes. Normal coloration  Neuro:  CN II-XII grossly intact. Does report some lingering numbness left forearm and face  Psych:  Normal mood and affect. Signed:  Gamaliel Banerjee MD    Part of this note may have been written by using a voice dictation software. The note has been proof read but may still contain some grammatical/other typographical errors.

## 2022-08-09 ENCOUNTER — TELEPHONE (OUTPATIENT)
Dept: NEUROLOGY | Age: 57
End: 2022-08-09

## 2022-08-09 NOTE — TELEPHONE ENCOUNTER
Rhonda Rodriges, WENDY Lora  Patient does not need follow up with Neurology. She was recommended to follow up with PCP per Vishnu Meyers notes.      Amandeep Field

## 2022-11-15 ENCOUNTER — OFFICE VISIT (OUTPATIENT)
Dept: OCCUPATIONAL MEDICINE | Age: 57
End: 2022-11-15

## 2022-11-15 VITALS
SYSTOLIC BLOOD PRESSURE: 147 MMHG | RESPIRATION RATE: 18 BRPM | TEMPERATURE: 98.1 F | HEART RATE: 97 BPM | DIASTOLIC BLOOD PRESSURE: 88 MMHG

## 2022-11-15 DIAGNOSIS — R51.9 SINUS HEADACHE: Primary | ICD-10-CM

## 2022-11-15 DIAGNOSIS — R05.1 ACUTE COUGH: ICD-10-CM

## 2022-11-15 LAB
INFLUENZA A ANTIGEN, POC: NEGATIVE
INFLUENZA B ANTIGEN, POC: NEGATIVE
SARS-COV-2 RNA, POC: NEGATIVE

## 2022-11-15 RX ORDER — ARIPIPRAZOLE 2 MG/1
2 TABLET ORAL NIGHTLY
COMMUNITY
Start: 2022-07-06 | End: 2023-01-02

## 2022-11-15 RX ORDER — AMOXICILLIN 500 MG/1
CAPSULE ORAL
COMMUNITY
Start: 2022-11-10

## 2022-11-15 RX ORDER — FERROUS SULFATE 325(65) MG
1 TABLET ORAL
COMMUNITY

## 2022-11-15 RX ORDER — FLUTICASONE PROPIONATE 50 MCG
2 SPRAY, SUSPENSION (ML) NASAL DAILY
Qty: 16 G | Refills: 0 | Status: SHIPPED | OUTPATIENT
Start: 2022-11-15

## 2022-11-15 RX ORDER — ALPRAZOLAM 1 MG/1
TABLET ORAL
COMMUNITY
Start: 2022-09-07

## 2022-11-15 ASSESSMENT — ENCOUNTER SYMPTOMS
SINUS COMPLAINT: 1
SINUS PRESSURE: 1
COUGH: 1
EYE DISCHARGE: 0
HOARSE VOICE: 0
WHEEZING: 0
NAUSEA: 0
SWOLLEN GLANDS: 0
EYE ITCHING: 0
EYE PAIN: 0
RHINORRHEA: 1
VOMITING: 0
EYE REDNESS: 0
SORE THROAT: 0
DIARRHEA: 0
ABDOMINAL PAIN: 0
SHORTNESS OF BREATH: 0

## 2022-11-15 ASSESSMENT — PATIENT HEALTH QUESTIONNAIRE - PHQ9
SUM OF ALL RESPONSES TO PHQ QUESTIONS 1-9: 0
1. LITTLE INTEREST OR PLEASURE IN DOING THINGS: 0
2. FEELING DOWN, DEPRESSED OR HOPELESS: 0
SUM OF ALL RESPONSES TO PHQ QUESTIONS 1-9: 0
SUM OF ALL RESPONSES TO PHQ9 QUESTIONS 1 & 2: 0

## 2022-11-15 NOTE — PROGRESS NOTES
PROGRESS NOTE    SUBJECTIVE     Aquilino Eugene is a 62 y.o. female seen for:    Chief Complaint    Sinus Problem     . Patient has had sinus pain/headache, cough and sneezing on and off for the last two weeks. At first the patient thought she was getting a sinus infection and went to her primary care provider on November 10th and was prescribed amoxicillin, which she is still taking. 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 Patient states that she's been wearing a surgical mask at work but hasn't found it to be very helpful. When she's at work, she'll have pain and fullness in her maxillary sinuses and rates the pain as 6 out of 10 but now she states she has no sinus pain at all. Patient states that she only coughs when she's at work and it's not productive. Prior to the construction occurring in her office, patient has no symptoms. Patient states that her blood pressure is not usually high and that she has been taking Zyrtec D but didn't think it would raise her blood pressure this much. Patient's blood pressure was 162/80 on 11/10/22 on her appointment with her primary care provider. Patient denies any headaches, blurred vision, or dizziness currently. Sinus Problem  This is a new problem. The current episode started in the past 7 days. The problem has been waxing and waning since onset. Her pain is at a severity of 0/10. She is experiencing no pain. Associated symptoms include congestion, coughing, headaches, sinus pressure and sneezing. Pertinent negatives include no chills, diaphoresis, ear pain, hoarse voice, neck pain, shortness of breath, sore throat or swollen glands. Past treatments include oral decongestants, antibiotics and saline sprays. The treatment provided mild relief.      Current Outpatient Medications   Medication Sig Dispense Refill ARIPiprazole (ABILIFY) 2 MG tablet Take 2 mg by mouth nightly      amoxicillin (AMOXIL) 500 MG capsule       ALPRAZolam (XANAX) 1 MG tablet TAKE 1 TABLET BY MOUTH ONCE DAILY AS NEEDED FOR ANXIETY      ferrous sulfate (IRON 325) 325 (65 Fe) MG tablet Take 1 tablet by mouth      aspirin 81 MG EC tablet Take 1 tablet by mouth daily 90 tablet 1    simvastatin (ZOCOR) 10 MG tablet Take 1 tablet by mouth nightly 30 tablet 1    FLUoxetine (PROZAC) 20 MG capsule Take 20 mg by mouth daily      fluticasone (FLONASE) 50 MCG/ACT nasal spray 2 sprays by Each Nostril route daily 16 g 0     No current facility-administered medications for this visit. No Known Allergies  Past Medical History:   Diagnosis Date    Anemia     Depression     anxiety, pt reports started after son was killed     Past Surgical History:   Procedure Laterality Date    BREAST BIOPSY Bilateral UNKNOWN    ORTHOPEDIC SURGERY      right wrist surgery    [address] THYROID BIOPSY  3/22/2018     THYROID BIOPSY [facility] HISTORICAL       Social History     Tobacco Use    Smoking status: Never    Smokeless tobacco: Never   Substance Use Topics    Alcohol use: Yes    Drug use: No        Family History   Problem Relation Age of Onset    Diabetes Mother     Hypertension Mother     Cancer Father         prostate    Breast Cancer Maternal Cousin     Ovarian Cancer Neg Hx     Uterine Cancer Neg Hx     Colon Cancer Neg Hx        Review of Systems   Constitutional:  Positive for fatigue. Negative for chills, diaphoresis and fever. HENT:  Positive for congestion, rhinorrhea, sinus pressure and sneezing. Negative for ear pain, hoarse voice, postnasal drip and sore throat. Eyes:  Negative for pain, discharge, redness, itching and visual disturbance. Respiratory:  Positive for cough. Negative for shortness of breath and wheezing. Cardiovascular:  Negative for chest pain.    Gastrointestinal:  Negative for abdominal pain, diarrhea, nausea and vomiting. Musculoskeletal:  Negative for neck pain. Skin:  Negative for rash. Neurological:  Positive for headaches. Negative for dizziness, syncope, weakness, light-headedness and numbness. Psychiatric/Behavioral:  Negative for dysphoric mood and suicidal ideas. OBJECTIVE    BP (!) 147/88 (Site: Left Upper Arm, Position: Sitting, Cuff Size: Large Adult)   Pulse 97   Temp 98.1 °F (36.7 °C) (Oral)   Resp 18    PHQ-9 Total Score: 0 (11/15/2022  9:36 AM)      Physical Exam  Vitals reviewed. Constitutional:       General: She is not in acute distress. Appearance: Normal appearance. She is not ill-appearing, toxic-appearing or diaphoretic. HENT:      Head: Normocephalic. Jaw: No trismus. Right Ear: Hearing, ear canal and external ear normal. A middle ear effusion is present. There is no impacted cerumen. Left Ear: Hearing, tympanic membrane, ear canal and external ear normal. There is no impacted cerumen. Ears:      Comments: Small amount of fluid in the right TM     Nose: Congestion and rhinorrhea present. Right Nostril: No foreign body, epistaxis or occlusion. Left Nostril: No foreign body, epistaxis or occlusion. Right Turbinates: Swollen. Not enlarged or pale. Left Turbinates: Swollen. Not enlarged or pale. Right Sinus: No maxillary sinus tenderness or frontal sinus tenderness. Left Sinus: No maxillary sinus tenderness or frontal sinus tenderness. Comments: Reddened turbinates; no pain over sinuses currently     Mouth/Throat:      Mouth: Mucous membranes are moist.      Pharynx: Oropharynx is clear. Uvula midline. Posterior oropharyngeal erythema present. No pharyngeal swelling, oropharyngeal exudate or uvula swelling. Tonsils: No tonsillar exudate or tonsillar abscesses. 0 on the right. 0 on the left. Comments: Cobblestone and erythemic appearance of the posterior pharynx  Eyes:      General: No scleral icterus.         Right eye: No discharge. Left eye: No discharge. Extraocular Movements: Extraocular movements intact. Conjunctiva/sclera: Conjunctivae normal.      Pupils: Pupils are equal, round, and reactive to light. Cardiovascular:      Rate and Rhythm: Normal rate and regular rhythm. Pulses: Normal pulses. Radial pulses are 2+ on the right side and 2+ on the left side. Heart sounds: Normal heart sounds. No murmur heard. No friction rub. No gallop. Pulmonary:      Effort: Pulmonary effort is normal. No respiratory distress. Breath sounds: Normal breath sounds. No stridor. No wheezing, rhonchi or rales. Musculoskeletal:         General: No swelling. Normal range of motion. Cervical back: Normal range of motion and neck supple. No rigidity. Lymphadenopathy:      Cervical: No cervical adenopathy. Skin:     General: Skin is warm and dry. Capillary Refill: Capillary refill takes less than 2 seconds. Coloration: Skin is not jaundiced or pale. Findings: No erythema or rash. Neurological:      General: No focal deficit present. Mental Status: She is alert and oriented to person, place, and time. Mental status is at baseline. Motor: No weakness. Coordination: Coordination normal.      Gait: Gait normal.   Psychiatric:         Mood and Affect: Mood normal.         Behavior: Behavior normal.         Thought Content:  Thought content normal.         Judgment: Judgment normal.       Results for orders placed or performed in visit on 11/15/22   AMB POC SARS-COV-2 AND INFLUENZA A/B   Result Value Ref Range    SARS-COV-2 RNA, POC Negative     Influenza A Antigen, POC Negative Negative    Influenza B Antigen, POC Negative Negative        Lab Results   Component Value Date    WBC 7.9 07/01/2022    HGB 11.4 (L) 07/01/2022    HCT 35.3 (L) 07/01/2022    MCV 84.7 07/01/2022     07/01/2022       Lab Results   Component Value Date     06/30/2022    K 4.2 06/30/2022     06/30/2022    CO2 29 06/30/2022    BUN 12 06/30/2022    CREATININE 1.00 06/30/2022    GLUCOSE 105 (H) 06/30/2022    CALCIUM 9.3 06/30/2022    PROT 8.4 (H) 06/30/2022    LABALBU 3.8 06/30/2022    BILITOT 0.5 06/30/2022    ALKPHOS 94 06/30/2022    AST 12 (L) 06/30/2022    ALT 18 06/30/2022    LABGLOM >60 06/30/2022    GFRAA >60 06/30/2022    GLOB 4.6 (H) 06/30/2022           Lab Results   Component Value Date/Time     06/30/2022 06:49 AM    K 4.2 06/30/2022 06:49 AM     06/30/2022 06:49 AM    CO2 29 06/30/2022 06:49 AM    BUN 12 06/30/2022 06:49 AM    CREATININE 1.00 06/30/2022 06:49 AM    GLUCOSE 105 06/30/2022 06:49 AM    CALCIUM 9.3 06/30/2022 06:49 AM        Hemoglobin A1C   Date Value Ref Range Status   07/01/2022 6.0 4.20 - 6.30 % Final       Lab Results   Component Value Date    CHOL 150 07/01/2022     Lab Results   Component Value Date    TRIG 53 07/01/2022     Lab Results   Component Value Date    HDL 67 (H) 07/01/2022     Lab Results   Component Value Date    LDLCALC 72.4 07/01/2022     Lab Results   Component Value Date    LABVLDL 10.6 07/01/2022     Lab Results   Component Value Date    CHOLHDLRATIO 2.2 07/01/2022       No results found for: TSHFT4, TSH, TSHREFLEX, SWX0ENC    ASSESSMENT and PLAN    Kobe Rose was seen today for sinus problem. Diagnoses and all orders for this visit:    Sinus headache  -     AMB POC SARS-COV-2 AND INFLUENZA A/B  -     fluticasone (FLONASE) 50 MCG/ACT nasal spray; 2 sprays by Each Nostril route daily    Acute cough  -     AMB POC SARS-COV-2 AND INFLUENZA A/B    Advised patient that her COVID-19 and influenza rapid tests are negative in the office today. Given that symptoms occur only at work when construction is going on, it is more likely that her symptoms are allergic rather than viral/bacterial but it both could have been occurring together. Continue taking amoxicillin as prescribed by her primary care physician.  Advised stopping the decongestant in Zyrtec but just take Zyrtec on its own as the decongestant is increasing her blood pressure. Continue to monitor blood pressure to see if it remains elevated after she is feeling better    Educated patient on using the neti pot, including boiling the water first and letting it cool to a warm temperature before performing the sinus rinse. When ready to perform sinus rinse, pour the warm water into the neti pot and add a saline packet to the water. Gentle swirl the water in the pot to distribute the contents of the saline packet. It is best to do neti pot in the morning and night and use the Flonase after doing a sinus rinse. Gargle with the leftover salt water from the neti pot. For the Flonase nasal spray, you have the option to use the spray once or twice a day - if once a day, spray 2 puffs into each nostril and if twice a day, spray 1 puff into each nostril. Spray with your head down and and try to spray within the nose as opposed to down the back of your throat. Flonase takes several days to a week to work so continue to use this daily to see the best effects. Consider wearing an N95 mask while at work to help filter more of the particles out. After work, take a shower, do a neti pot, and use the Flonase. If headache occurs while at work can take ibuprofen 400 mg every 6-8 hours as needed. If no improvement despite construction resolving in her office, we can follow up with an allergist.    Reminded patient about the [employer] benefit offered through [facility], where employees (and their dependents) get free phone support, as well as referrals for in-person consultations with clinical, legal and financial professionals. To access these resources, call [phone], [place]CYRUS to [phone], or visit [employer] (password: [email or web address]). Return to the clinic for persisting/worsening symptoms or new complaints that arise.  Discussed signs and symptoms that would warrant immediate evaluation including, but not limited to severe headache, blurred vision, speech disturbance, difficulty with ambulation/gait, numbness, tingling, weakness, syncope, chest pain, or shortness of breath. Side effects and risk vs benefits associated with medications prescribed were discussed with patient who verbalized understanding. Pattent verbalized understanding and agreement with above plan of care. Counseled on benefits of having a primary care provider which includes, but is not limited to, continuity of care and having a medical home when concerns arise. Also enforced that onsite clinic policy states that we are not to take the place of a primary care provider. I have reviewed the patient's medication list, past medical, family, social, and surgical history in detail and updated the patient record appropriately.      WENDY Gross NP

## 2022-11-23 ENCOUNTER — NURSE ONLY (OUTPATIENT)
Dept: INTERNAL MEDICINE CLINIC | Facility: CLINIC | Age: 57
End: 2022-11-23
Payer: COMMERCIAL

## 2022-11-23 DIAGNOSIS — I10 HYPERTENSION, UNSPECIFIED TYPE: Primary | ICD-10-CM

## 2022-11-23 PROCEDURE — 93000 ELECTROCARDIOGRAM COMPLETE: CPT | Performed by: NURSE PRACTITIONER

## 2022-12-07 ENCOUNTER — TELEPHONE (OUTPATIENT)
Dept: OCCUPATIONAL MEDICINE | Age: 57
End: 2022-12-07

## 2022-12-07 NOTE — TELEPHONE ENCOUNTER
Patient called the clinic because she's feeling much worse since yesterday. 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 Patient has continued to take zyrtec as well as Flonase. Patient states that her cough was much worse yesterday and she had some body aches. Patient took her temperature yesterday and it was 98.4F. She took a flu test at work, which was negative. Patient denies fever or chills and body aches are not occurring today. Patient states that she's having some chest pain when she is coughing only - patient states that the pain is mainly over there rib cage in her upper back. Patient denies shortness of breath but states that she has a \"little bit of a  wheeze\" at times. Patient has never needed to use an inhaler with viral illnesses in the past. Patient states that she slept terribly last night due to the cough. Patient didn't go to work today so that she could rest.     Patient states that she's supposed to get the flu shot by 12/14 but is not sure when she should take it because of her symptoms right now. Advised the patient get rest and increase fluids (will provide a note for being off work today and tomorrow; sending via email on file). Recommended that the patient comes to the employee wellness center if she's still wheezing or having difficulty breathing tomorrow. Also advised hot tea with honey, guaifenesin plain cough medicine to help thin out secretions and make it easier for you to cough up those secretions, and Nyquil at night. (Nyquill should just have acetaminophen, dextromethorphan, and a Benadryl type product in - make sure it does not have pseudoephedrine or phenylephrine in it as that will increase your blood pressure).     Recommended that patient take the flu shot when she's feeling better - hopefully by sometime next week.      WENDY Zabala - NP

## 2023-08-10 ENCOUNTER — HOSPITAL ENCOUNTER (OUTPATIENT)
Dept: MAMMOGRAPHY | Age: 58
Discharge: HOME | End: 2023-08-10
Payer: COMMERCIAL

## 2023-08-10 VITALS — BODY MASS INDEX: 28.73 KG/M2 | WEIGHT: 178 LBS

## 2023-08-10 DIAGNOSIS — Z12.31 VISIT FOR SCREENING MAMMOGRAM: ICD-10-CM

## 2023-08-10 PROCEDURE — 77067 SCR MAMMO BI INCL CAD: CPT

## 2023-08-11 LAB
ALBUMIN SERPL-MCNC: 3.9 G/DL (ref 3.5–5)
ALBUMIN/GLOB SERPL: 1.1 (ref 0.4–1.6)
ALP SERPL-CCNC: 87 U/L (ref 50–136)
ALT SERPL-CCNC: 20 U/L (ref 12–65)
ANION GAP SERPL CALC-SCNC: 11 MMOL/L (ref 2–11)
AST SERPL-CCNC: 16 U/L (ref 15–37)
BASOPHILS # BLD: 0 K/UL (ref 0–0.2)
BASOPHILS NFR BLD: 1 % (ref 0–2)
BILIRUB SERPL-MCNC: 0.4 MG/DL (ref 0.2–1.1)
BUN SERPL-MCNC: 9 MG/DL (ref 6–23)
CALCIUM SERPL-MCNC: 9.2 MG/DL (ref 8.3–10.4)
CHLORIDE SERPL-SCNC: 111 MMOL/L (ref 101–110)
CHOLEST SERPL-MCNC: 154 MG/DL
CO2 SERPL-SCNC: 24 MMOL/L (ref 21–32)
CREAT SERPL-MCNC: 0.9 MG/DL (ref 0.6–1)
DIFFERENTIAL METHOD BLD: ABNORMAL
EOSINOPHIL # BLD: 0 K/UL (ref 0–0.8)
EOSINOPHIL NFR BLD: 1 % (ref 0.5–7.8)
ERYTHROCYTE [DISTWIDTH] IN BLOOD BY AUTOMATED COUNT: 14.9 % (ref 11.9–14.6)
EST. AVERAGE GLUCOSE BLD GHB EST-MCNC: 126 MG/DL
GLOBULIN SER CALC-MCNC: 3.5 G/DL (ref 2.8–4.5)
GLUCOSE SERPL-MCNC: 85 MG/DL (ref 65–100)
HBA1C MFR BLD: 6 % (ref 4.8–5.6)
HCT VFR BLD AUTO: 37.2 % (ref 35.8–46.3)
HDLC SERPL-MCNC: 57 MG/DL (ref 40–60)
HDLC SERPL: 2.7
HGB BLD-MCNC: 11.4 G/DL (ref 11.7–15.4)
IMM GRANULOCYTES # BLD AUTO: 0 K/UL (ref 0–0.5)
IMM GRANULOCYTES NFR BLD AUTO: 0 % (ref 0–5)
LDLC SERPL CALC-MCNC: 85.2 MG/DL
LYMPHOCYTES # BLD: 2.8 K/UL (ref 0.5–4.6)
LYMPHOCYTES NFR BLD: 45 % (ref 13–44)
MCH RBC QN AUTO: 27.3 PG (ref 26.1–32.9)
MCHC RBC AUTO-ENTMCNC: 30.6 G/DL (ref 31.4–35)
MCV RBC AUTO: 89 FL (ref 82–102)
MONOCYTES # BLD: 0.4 K/UL (ref 0.1–1.3)
MONOCYTES NFR BLD: 7 % (ref 4–12)
NEUTS SEG # BLD: 2.9 K/UL (ref 1.7–8.2)
NEUTS SEG NFR BLD: 46 % (ref 43–78)
NRBC # BLD: 0 K/UL (ref 0–0.2)
PLATELET # BLD AUTO: 268 K/UL (ref 150–450)
PMV BLD AUTO: 11.7 FL (ref 9.4–12.3)
POTASSIUM SERPL-SCNC: 4.3 MMOL/L (ref 3.5–5.1)
PROT SERPL-MCNC: 7.4 G/DL (ref 6.3–8.2)
RBC # BLD AUTO: 4.18 M/UL (ref 4.05–5.2)
SODIUM SERPL-SCNC: 146 MMOL/L (ref 133–143)
TRIGL SERPL-MCNC: 59 MG/DL (ref 35–150)
TSH, 3RD GENERATION: 1.19 UIU/ML (ref 0.36–3.74)
VLDLC SERPL CALC-MCNC: 11.8 MG/DL (ref 6–23)
WBC # BLD AUTO: 6.2 K/UL (ref 4.3–11.1)

## 2023-10-10 ENCOUNTER — OFFICE VISIT (OUTPATIENT)
Dept: SURGERY | Age: 58
End: 2023-10-10
Payer: COMMERCIAL

## 2023-10-10 VITALS
HEART RATE: 68 BPM | WEIGHT: 178 LBS | BODY MASS INDEX: 28.61 KG/M2 | SYSTOLIC BLOOD PRESSURE: 142 MMHG | HEIGHT: 66 IN | DIASTOLIC BLOOD PRESSURE: 82 MMHG | OXYGEN SATURATION: 98 %

## 2023-10-10 DIAGNOSIS — D17.23 LIPOMA OF RIGHT LOWER EXTREMITY: ICD-10-CM

## 2023-10-10 PROCEDURE — 99202 OFFICE O/P NEW SF 15 MIN: CPT | Performed by: SURGERY

## 2023-10-10 NOTE — PROGRESS NOTES
[facility]  [address] [facility] [address] [place] [address], [address] [place] [address] Blessing López, [address]  [facility]  [phone]    Office Note/H&P/Consult Note   Satish Rivas   MRN: [id number]     : 1965        HPI: Satish Rivas is a 62 y.o. female who is here to see me today for a enlarging lipoma of her right anterior shin. Is been present for some time but as stated is enlarging. It does not cause her any pain. She is here to talk about having it removed. Past Medical History:   Diagnosis Date    Anemia     Depression     anxiety, pt reports started after son was killed     Past Surgical History:   Procedure Laterality Date    BREAST BIOPSY Bilateral UNKNOWN    ORTHOPEDIC SURGERY      right wrist surgery    TUBAL LIGATION       THYROID BIOPSY  3/22/2018     THYROID BIOPSY [address] [facility] HISTORICAL     Current Outpatient Medications   Medication Sig    ALPRAZolam (XANAX) 1 MG tablet TAKE 1 TABLET BY MOUTH ONCE DAILY AS NEEDED FOR ANXIETY    FLUoxetine (PROZAC) 20 MG capsule Take 1 capsule by mouth daily    fluticasone (FLONASE) 50 MCG/ACT nasal spray 2 sprays by Each Nostril route daily    ARIPiprazole (ABILIFY) 2 MG tablet Take 2 mg by mouth nightly    amoxicillin (AMOXIL) 500 MG capsule     ferrous sulfate (IRON 325) 325 (65 Fe) MG tablet Take 1 tablet by mouth    aspirin 81 MG EC tablet Take 1 tablet by mouth daily    simvastatin (ZOCOR) 10 MG tablet Take 1 tablet by mouth nightly     No current facility-administered medications for this visit. ALLERGIES:  Patient has no known allergies.     Social History     Socioeconomic History    Marital status: Single     Spouse name: None    Number of children: None    Years of education: None    Highest education level: None   Tobacco Use    Smoking status: Never    Smokeless tobacco: Never   Substance and Sexual Activity    Alcohol use: Yes    Drug use: No   Social History Narrative    Abuse: Feels safe at home, no history of physical abuse, no history of

## 2023-10-24 ENCOUNTER — OFFICE VISIT (OUTPATIENT)
Dept: SURGERY | Age: 58
End: 2023-10-24

## 2023-10-24 DIAGNOSIS — D17.23 LIPOMA OF RIGHT LOWER EXTREMITY: Primary | ICD-10-CM

## 2023-10-24 NOTE — PROGRESS NOTES
[facility]  [phone]        poDate: 10/24/2023      Name: Cade Cueto      MRN: [id number]       : 1965       Age: 62 y.o. Sex: female        Uli Garza MD       CC:    Chief Complaint   Patient presents with    Procedure       HPI:  The patient presents today for removal of a lipoma over her right anterior shin. I saw her on the  of this month but she was unable to have it done that day as she was going to work. Physical Exam:     There were no vitals taken for this visit. General: Alert, oriented, cooperative and in no acute distress. Neck: Supple, trachea midline, no appreciable thyromegaly  Resp: No JVD. Breathing is  non-labored. Lungs clear to auscultation without wheezing or rhonchi   CV: RRR. No murmurs, rubs or gallops appreciated. Abd: soft non-tender and non-distended without peritoneal signs. +bs    Skin/incision: Right anterior shin was prepped and draped in sterile fashion. 1% Xylocaine with epinephrine was infiltrated. Incision made directly over the lipoma and this was sharply removed completely. Size 4 x 2 cm. Incision was closed with interrupted 5-0 Ethilon's. Sterile dressing applied. Discussed sending this to pathology and the patient did not want to do this. I am in agreement. See her back in 1 week for suture removal.    Assessment/Plan:  Cade Cueto is a 62 y.o. female who is s/p excision of a 4 x 2 cm lipoma of her right anterior shin. 1. Return in 1 week for suture removal.    2. Return to full activity    3.  Regular diet    Signed: Akash Flynn MD    10/24/2023  10:53 AM

## 2023-10-31 ENCOUNTER — OFFICE VISIT (OUTPATIENT)
Dept: SURGERY | Age: 58
End: 2023-10-31

## 2023-10-31 DIAGNOSIS — Z48.89 POSTOPERATIVE VISIT: ICD-10-CM

## 2023-10-31 PROCEDURE — 99024 POSTOP FOLLOW-UP VISIT: CPT | Performed by: SURGERY

## 2023-10-31 NOTE — PROGRESS NOTES
[facility]  [phone]        poDate: 10/31/2023      Name: Abraham Valdes      MRN: [id number]       : 1965       Age: 62 y.o. Sex: female        Irene Velazquez MD       CC:    Chief Complaint   Patient presents with    Post-Op Check       HPI:  The patient presents for a post-op visit s/p removal of a lipoma from her lower leg. She has done beautifully. Physical Exam:     There were no vitals taken for this visit. General: Alert, oriented, cooperative and in no acute distress. Neck: Supple, trachea midline, no appreciable thyromegaly  Resp: No JVD. Breathing is  non-labored. Lungs clear to auscultation without wheezing or rhonchi   CV: RRR. No murmurs, rubs or gallops appreciated. Abd: soft non-tender and non-distended without peritoneal signs. +bs    Skin/incision:  Clean, dry and intact. Assessment/Plan:  Abraham Valdes is a 62 y.o. female who is s/p removal of a lipoma from her lower leg. 1. Follow-up as needed    2. Return to full activity    3.  Regular diet    Signed: Harika Strong MD    10/31/2023  10:31 AM

## 2023-12-13 ENCOUNTER — NURSE ONLY (OUTPATIENT)
Dept: FAMILY MEDICINE CLINIC | Facility: CLINIC | Age: 58
End: 2023-12-13
Payer: COMMERCIAL

## 2023-12-13 VITALS
DIASTOLIC BLOOD PRESSURE: 78 MMHG | BODY MASS INDEX: 28.87 KG/M2 | WEIGHT: 179.6 LBS | SYSTOLIC BLOOD PRESSURE: 128 MMHG | HEART RATE: 104 BPM | HEIGHT: 66 IN | OXYGEN SATURATION: 96 % | TEMPERATURE: 98.2 F

## 2023-12-13 DIAGNOSIS — H10.31 ACUTE CONJUNCTIVITIS OF RIGHT EYE, UNSPECIFIED ACUTE CONJUNCTIVITIS TYPE: ICD-10-CM

## 2023-12-13 DIAGNOSIS — R19.7 DIARRHEA OF PRESUMED INFECTIOUS ORIGIN: Primary | ICD-10-CM

## 2023-12-13 LAB
EXP DATE SOLUTION: NORMAL
EXP DATE SWAB: NORMAL
EXPIRATION DATE: NORMAL
LOT NUMBER POC: NORMAL
LOT NUMBER SOLUTION: NORMAL
LOT NUMBER SWAB: NORMAL
SARS-COV-2 RNA, POC: NEGATIVE

## 2023-12-13 PROCEDURE — 99213 OFFICE O/P EST LOW 20 MIN: CPT | Performed by: FAMILY MEDICINE

## 2023-12-13 PROCEDURE — 87635 SARS-COV-2 COVID-19 AMP PRB: CPT | Performed by: FAMILY MEDICINE

## 2023-12-13 RX ORDER — OFLOXACIN 3 MG/ML
1 SOLUTION/ DROPS OPHTHALMIC 4 TIMES DAILY
Qty: 5 ML | Refills: 0 | Status: SHIPPED | OUTPATIENT
Start: 2023-12-13 | End: 2023-12-18

## 2023-12-13 SDOH — ECONOMIC STABILITY: FOOD INSECURITY: WITHIN THE PAST 12 MONTHS, THE FOOD YOU BOUGHT JUST DIDN'T LAST AND YOU DIDN'T HAVE MONEY TO GET MORE.: NEVER TRUE

## 2023-12-13 SDOH — ECONOMIC STABILITY: FOOD INSECURITY: WITHIN THE PAST 12 MONTHS, YOU WORRIED THAT YOUR FOOD WOULD RUN OUT BEFORE YOU GOT MONEY TO BUY MORE.: NEVER TRUE

## 2023-12-13 SDOH — ECONOMIC STABILITY: INCOME INSECURITY: HOW HARD IS IT FOR YOU TO PAY FOR THE VERY BASICS LIKE FOOD, HOUSING, MEDICAL CARE, AND HEATING?: NOT HARD AT ALL

## 2023-12-13 SDOH — ECONOMIC STABILITY: HOUSING INSECURITY
IN THE LAST 12 MONTHS, WAS THERE A TIME WHEN YOU DID NOT HAVE A STEADY PLACE TO SLEEP OR SLEPT IN A SHELTER (INCLUDING NOW)?: NO

## 2023-12-13 ASSESSMENT — PATIENT HEALTH QUESTIONNAIRE - PHQ9
SUM OF ALL RESPONSES TO PHQ QUESTIONS 1-9: 2
2. FEELING DOWN, DEPRESSED OR HOPELESS: 1
1. LITTLE INTEREST OR PLEASURE IN DOING THINGS: 1
SUM OF ALL RESPONSES TO PHQ QUESTIONS 1-9: 2
SUM OF ALL RESPONSES TO PHQ9 QUESTIONS 1 & 2: 2
SUM OF ALL RESPONSES TO PHQ QUESTIONS 1-9: 2
SUM OF ALL RESPONSES TO PHQ QUESTIONS 1-9: 2

## 2023-12-13 ASSESSMENT — ENCOUNTER SYMPTOMS
CONSTIPATION: 0
VOMITING: 0
EYE PAIN: 1
NAUSEA: 1
PHOTOPHOBIA: 1
EYE DISCHARGE: 1
DIARRHEA: 0

## 2023-12-13 NOTE — PROGRESS NOTES
Cade Cueto is a 62 y.o. female who presents today for the following:  Chief Complaint   Patient presents with    Diarrhea    Eye Problem     Feels irritated a little red RIGHT    Other     Dry mouth       No Known Allergies    Current Outpatient Medications   Medication Sig Dispense Refill    ALPRAZolam (XANAX) 1 MG tablet TAKE 1 TABLET BY MOUTH ONCE DAILY AS NEEDED FOR ANXIETY      FLUoxetine (PROZAC) 20 MG capsule Take 1 capsule by mouth daily       No current facility-administered medications for this visit. Past Medical History:   Diagnosis Date    Anemia     Depression     anxiety, pt reports started after son was killed       Past Surgical History:   Procedure Laterality Date    BREAST BIOPSY Bilateral UNKNOWN    ORTHOPEDIC SURGERY      right wrist surgery    [address] THYROID BIOPSY  3/22/2018     THYROID BIOPSY [facility] HISTORICAL       Social History     Tobacco Use    Smoking status: Never    Smokeless tobacco: Never   Substance Use Topics    Alcohol use: Yes        Family History   Problem Relation Age of Onset    Diabetes Mother     Hypertension Mother     Cancer Father         prostate    Breast Cancer Maternal Cousin     Ovarian Cancer Neg Hx     Uterine Cancer Neg Hx     Colon Cancer Neg Hx        Patient reports diarrhea this morning. Denies blood in stool. Does not usually have diarrhea. Reports having chills and nausea. No known sick contacts. Reports stomach is cramping. Feels nauseated. Works at internal medicine. She also noted right eye is red and irritated. It is sensitive to light. Denies changes in vision. Review of Systems   Constitutional:  Positive for chills. Negative for fatigue and fever. Eyes:  Positive for photophobia, pain and discharge (watery). Negative for visual disturbance. Gastrointestinal:  Positive for nausea. Negative for constipation, diarrhea and vomiting. Musculoskeletal:  Negative for myalgias.

## 2024-08-15 NOTE — PROGRESS NOTES
Labs   Steffi Romanfrankie  : [facility] [address], [place], [address] [place] [address]  [phone]   [phone]        OUTPATIENT PHYSICAL THERAPY:Daily Note 3/20/2017    ICD-10: Treatment Diagnosis: low back pain (M54.5)  Precautions/Allergies:   Review of patient's allergies indicates no known allergies. Fall Risk Score: 1 (? 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 Jason Engle is a 46 y.o. female presenting to physical therapy with complaints of low back pain which began 16 after transferring a nursing home patient from the bed to a wheelchair. Patient was seen in our clinic at the end of  and is returning with referral from her pain management doctors. She reports feeling much better than she did before, but having constant low back pain. She states her pain is 5/10 with minimal to no change based on positioning or activity level. Patient does have some limitations with dressing, bathing, driving, lifting, carrying, vacuuming, sitting in the car, walking long distances, and sleeping through the night without waking due to low back pain. Patient is out of work (CNA) but would like to be able to return once her pain is controlled and she can perform the duties required of her. Patient presents with increased pain, decreased strength, decreased ROM, decreased flexibility,impaired transfer ability, decreased activity tolerance, and overall impaired functional mobility.  Patient is a good candidate for skilled physical therapy interventions to include manual therapy, therapeutic exercise, balance training, gait training, transfer training, postural re-education, body mechanics training, and pain modalities as needed. PROGRESS NOTE 3/6/17: Patient has been seen for 6 sessions of physical therapy from 2/9/17 to 3/6/17. She reports feeling 40% better with no leg pain, improving functional mobility, being able to lift more, and focusing on keeping tummy tight and using proper body mechanics. She states she needs to continue progressing her lifting activities, including different heights and weights, and increased endurance with activities. She continues to report a constant 5/10 pain, even at rest, but states she feels as though she is getting used to it. Patient has met some of her goals set at initial visit and is progressing with the rest. She should benefit from continued skilled therapy to address remaining goals and deficits. PROBLEM LIST (Impacting functional limitations):  1. Decreased Strength  2. Decreased ADL/Functional Activities  3. Decreased Transfer Abilities  4. Decreased Ambulation Ability/Technique  5. Increased Pain  6. Decreased Activity Tolerance  7. Decreased Pacing Skills  8. Decreased Work Simplification/Energy Conservation Techniques  9. Decreased Flexibility/Joint Mobility INTERVENTIONS PLANNED:  1. Balance Exercise  2. Cold  3. Electrical Stimulation  4. Gait Training  5. Heat  6. Home Exercise Program (HEP)  7. Manual Therapy  8. Neuromuscular Re-education/Strengthening  9. Range of Motion (ROM)  10. Therapeutic Activites  11. Therapeutic Exercise/Strengthening  12. Transfer Training  13. Ultrasound (US)   TREATMENT PLAN:  Effective Dates: 2/9/17 to 3/27/17. Frequency/Duration: 2 times a week for 6 weeks (12 authorized sessions)  GOALS: (Goals have been discussed and agreed upon with patient.)  Short-Term Functional Goals: Time Frame: 2/9/17 to 3/3/17  1.  Patient will be independent with HEP to improve core stability, LE flexibility, and overall functional mobility. -GOAL MET  2. Patient will report no more than 4/10 low back pain at rest in order to demonstrate improved self pain control and tolerance. -ONGOING  3. Patient will be educated in and demonstrate proper squat lift technique in order to improve safety with lifting activities and reduce risk of future injuries. -GOAL MET  Discharge Goals: Time Frame: 2/9/17 to 3/27/17  1. Patient will improve gross core strength to 4/5 in order to improve lumbar stability for work related tasks. -ONGOING  2. Patient will be able to stand or walk with minimal rest breaks for 3 hours with minimal to no increase in low back pain in order to demonstrate improved activity tolerance. -ONGOING  3. Patient will be able to sleep through the night without waking due to low back pain in order to return to prior sleeping pattern for overall health and wellness. -ONGOING (reports an average of 2 times a night on 3/6/17)  4. Patient will be able to do housework, including vacuuming, with no increased low back pain in order to demonstrate return to prior level of function. -ONGOING  5. Patient will be able to lift and transfer 20 lbs with good form and no complaints of low back pain in order to improve safety with return to work activities. -ONGOING  6. Patient will improve Modified Oswestry Scale score to 10/50 from 18/50. -ONGOING (scored 21/50 on 3/6/17)  Rehabilitation Potential For Stated Goals: Good  Regarding Shayla Richard's therapy, I certify that the treatment plan above will be carried out by a therapist or under their direction. Thank you for this referral,  Brigitte Ordaz PT     Referring Physician Signature: Jaclyn Marley MD              Date                    The information in this section was collected on 3/6/17 (from 2/9/17) (except where otherwise noted).   HISTORY:   History of Present Injury/Illness (Reason for Referral):  Ms. Pate is a 46 y.o. female presenting to physical therapy with complaints of low back pain which began 8/16/16 after transferring a nursing home patient from the bed to a wheelchair. Patient was seen in our clinic at the end of 2016 and is returning with referral from her pain management doctors. She reports feeling much better than she did before, but having constant low back pain. She states her pain is 5/10 with minimal to no change based on positioning or activity level. Patient does have some limitations with dressing, bathing, driving, lifting, carrying, vacuuming, sitting in the car, walking long distances, and sleeping through the night without waking due to low back pain. Patient is out of work (CNA) but would like to be able to return once her pain is controlled and she can perform the duties required of her. Patient presents with increased pain, decreased strength, decreased ROM, decreased flexibility,impaired transfer ability, decreased activity tolerance, and overall impaired functional mobility. Past Medical History/Comorbidities:   Ms. Pate  has no past medical history on file. Ms. Pate  has no past surgical history on file. She reports lumbar spine MRI indicating arthritis and disc bulge. Social History/Living Environment:     Not currently working. Patient is a CNA at [employer]. Prior Level of Function/Work/Activity:  Patient is independent with ADLs, self care, transfers, and ambulation, but reports needing additional time or modifications due to low back pain. Dominant Side:         RIGHT  Personal Factors:          Sex:  female        Age:  46 y.o. Current Medications:       Current Outpatient Prescriptions:     cyclobenzaprine (FLEXERIL) 5 mg tablet, Take 2 Tabs by mouth three (3) times daily as needed for Muscle Spasm(s). , Disp: 30 Tab, Rfl: 0    lidocaine HCl (ASPERCREME, LIDOCAINE,) 4 % crea, 1 Dose by Apply Externally route three (3) times daily as needed. , Disp: 1 Tube, Rfl: 0   Date Last Reviewed:  3/20/2017   Number of Personal Factors/Comorbidities that affect the Plan of Care:  (Given chronicity of low back pain) 1-2: MODERATE COMPLEXITY   EXAMINATION:   Patient denies any LE paresthesia. Patient denies any increase of symptoms with cough, sneeze or valsalva. Patient denies any saddle paresthesia or bowel/bladder deficits. Observation/Orthostatic Postural Assessment:          In standing: minimally increased lumbar lordosis, increased skin fold noted above L ilium, L iliac crest higher than R, mild trendelenburg during stance on L LE. Palpation:          Minimal (from moderate) tenderness to palpation of L gluteal, piriformis, and hip musculature with moderate tightness noted. Minimal tenderness R gluteal and piriformis with moderate tightness noted. Minimal tenderness over lumbar spine and paraspinals. ROM:  Patient reported lumbar extension more uncomfortable than flexion  AROM (degrees)   Lumbar Flexion 50 (from 40)   Lumbar Extension 5   Moderate tightness noted in L hamstring muscle. Moderate tightness noted in bilateral piriformis muscles. Strength:  Mildly increased low back pain reported with resisted L hip flexion   Motion Tested Left   (*/5) Right  (*/5)   Hip Flexion 4+ (from 4) 5   Hip Abduction 4 4+   Knee Extension 5 4   Ankle Dorsiflexion 5 4   Gross core strength 3/5 as observed with transfers and activation of transverse abdominus. Special Tests:          Neural tension tests: Passive straight leg raise (SLR) test is negative. Crossed SLR test is negative. Slump test is negative. SRINIVAS: positive for pain on L        Thigh thrust: negative bilaterally        Single leg long axis distraction: negative for change in low back pain        Lumbar traction: negative for change in low back pain        Bilateral knee valgus with sit to stand transfer. Trendelenburg: positive on L     Passive Accessory Motion: None assessed today. Will continue to assess as needed.     Neurological Screen: Myotomes: Key muscle strength testing through bilateral LE is WNL. Dermatomes: Sensation to light touch for bilateral LE is intact from L2 to S2, some diminished sensation reported at L1 on L. Reflexes: Patellar (L3/ L4): 2+ bilaterally (L LE required multiple attempts)                Achilles (S1/ S2): 2+ bilaterally   Functional Mobility:         Gait/Ambulation:  Mild antalgic pattern, mild trendelenburg on L, minimal bilateral genu valgus, mildly slow tamiko. Transfers:  Minimal difficulty with sit to stand transfers with minimal (from minimal to moderate) use of bilateral UE. Balance:          Sitting and standing balance grossly intact. Body Structures Involved:  1. Bones  2. Joints  3. Muscles Body Functions Affected:  1. Sensory/Pain  2. Neuromusculoskeletal  3. Movement Related Activities and Participation Affected:  1. Mobility  2. Community, Social and [place]   Number of elements (examined above) that affect the Plan of Care:  (Chronicity of pain may affect plan of care, but patient with good progress with prior therapy.) 1-2: LOW COMPLEXITY   CLINICAL PRESENTATION:   Presentation: Evolving clinical presentation with changing clinical characteristics: MODERATE COMPLEXITY   CLINICAL DECISION MAKING:   Outcome Measure: Tool Used: Modified Oswestry Low Back Pain Questionnaire  Score:  Initial: 18/50  Most Recent: 21/50 (Date: 3/6/17 )   Interpretation of Score: Each section is scored on a 0-5 scale, 5 representing the greatest disability. The scores of each section are added together for a total score of 50.     Score 0 1-10 11-20 21-30 31-40 41-49 50   Modifier CH CI CJ CK CL CM CN     Medical Necessity:   · Patient is expected to demonstrate progress in strength, range of motion, balance, coordination and functional technique to increase independence with ambulation, transfers, and work related tasks and improve safety during lifting, carrying, tranfers, ambulation, travel, and returning to work duties. · Skilled intervention continues to be required due to increased low back pain hindering return to prior functional level including work. Reason for Services/Other Comments:  · Patient continues to require skilled intervention due to increased low back pain hindering overall activity tolerance, daily tasks, and return to prior funcitonal level including work/ job duties. Use of outcome tool(s) and clinical judgement create a POC that gives a:  (Due to chronicity of pain and reports of constant pain at this time. Patient has not yet returned to functional tasks which will be required for work which leaves patients predicted progress questionable.) Questionable prediction of patient's progress: MODERATE COMPLEXITY            TREATMENT:   (In addition to Assessment/Re-Assessment sessions the following treatments were rendered)  Pre-treatment Symptoms/Complaints: Patient 5 minutes late due to oversleeping. She reports feeling good this morning with her normal 5/10 pain. Pain: Initial:   Pain Intensity 1: 5  Pain Location 1: Back  Pain Orientation 1: Lower  Post Session: Patient stated she felt good at end of session with 5/10 in low back. Therapeutic Exercise: (40 Minutes):  Exercises per grid below to improve mobility, strength, balance and coordination. Required minimal verbal cues to promote proper body alignment, promote proper body posture and promote proper body mechanics. Progressed resistance, range, repetitions and complexity of movement as indicated.    Date:  3/20/17 Date:  3/13/17 Date:  3/16/17   Activity/Exercise Parameters Parameters Parameters   Transverse Abdominus (TA) contraction With all exercises With all activities With all activities   Piriformis stretch --- --- 3 x 30 seconds each   SKTC --- --- 3 x 30 seconds each   Clamshells --- --- ---   NU step Level 7, x 10 minutes Level 6, x 10 minutes Level 7, x 5 minutes   Mini squats With TA, 2 x 10 With TA, 2 x 10 Bamboo behind head, x 10- emphasis on form   Sit to stand With TA, 2 x 15 With TA, 2 x 10 ---   Calf stretch Slant board, 4 x 30 seconds Slant board, 4 x 30 seconds Slant board 4 x 30 sec   Lateral walks --- Red band at ankles, hallway x 1 each ---   Single leg stance --- --- ---   Lifting 6 inch step, 10 lbs, from stool carried across clinic then lifting 5 lbs overhead  6 inch step from pink stool with 10 lbs, x 5 to left and x 5 to right 4 inch step, 10 lbs, from stool carried across clinic then lifting overhead  4 inch step from 8 inch with airex, 10 lbs, x 5 to left and x 5 to right Cable cord, 15 lbs, squat lift with bar held close to body, x 10   Quadratus lumborum stretch --- --- Gentle, supine, x 2 each,  Standing, x 2 each           Time spent with patient reviewing proper muscle recruitment and technique with exercises. Manual Therapy (      ): none today    Therapeutic Modalities: for pain/ tightness:                 Lumbo-Sacral Spine Heat  Type: Moist pack  Duration : 15 minutes  Patient Position: Supine                             Lumbo-Sacral Spine Electric Stimulation  Type: Interferential  Placement: bilateral thoracolumbar spine  Duration : 15 minutes  Patient Position: Supine                                               HEP: As above; handouts given to patient for all exercises. ______________________________________________________________________________________________________    Treatment/Session Assessment:    · Response to Treatment: Patient with no complaints of increased low back pain with exercises today. Improving squat form and technique. · Compliance with Program/Exercises: Compliant most of the time. · Recommendations/Intent for next treatment session: \"Next visit will focus on advancements to more challenging activities\". Progress functional and work related tasks as tolerated. Manual therapy and modalities as needed for pain.     Total Treatment Duration: 55 minutes   PT Patient Time In/Time Out  Time In: 0705  Time Out: 0800    Alice Guillen, PT

## 2024-09-18 ENCOUNTER — TRANSCRIBE ORDERS (OUTPATIENT)
Dept: SCHEDULING | Age: 59
End: 2024-09-18

## 2024-09-18 DIAGNOSIS — Z12.31 SCREENING MAMMOGRAM FOR HIGH-RISK PATIENT: Primary | ICD-10-CM

## 2024-09-23 LAB
ALBUMIN SERPL-MCNC: 3.7 G/DL (ref 3.5–5)
ALBUMIN/GLOB SERPL: 1 (ref 1–1.9)
ALP SERPL-CCNC: 90 U/L (ref 35–104)
ALT SERPL-CCNC: 10 U/L (ref 12–65)
ANION GAP SERPL CALC-SCNC: 10 MMOL/L (ref 9–18)
AST SERPL-CCNC: 19 U/L (ref 15–37)
BASOPHILS # BLD: 0 K/UL (ref 0–0.2)
BASOPHILS NFR BLD: 1 % (ref 0–2)
BILIRUB SERPL-MCNC: 0.3 MG/DL (ref 0–1.2)
BUN SERPL-MCNC: 9 MG/DL (ref 6–23)
CALCIUM SERPL-MCNC: 9.4 MG/DL (ref 8.8–10.2)
CHLORIDE SERPL-SCNC: 104 MMOL/L (ref 98–107)
CHOLEST SERPL-MCNC: 178 MG/DL (ref 0–200)
CO2 SERPL-SCNC: 25 MMOL/L (ref 20–28)
CREAT SERPL-MCNC: 0.88 MG/DL (ref 0.6–1.1)
DIFFERENTIAL METHOD BLD: ABNORMAL
EOSINOPHIL # BLD: 0.1 K/UL (ref 0–0.8)
EOSINOPHIL NFR BLD: 2 % (ref 0.5–7.8)
ERYTHROCYTE [DISTWIDTH] IN BLOOD BY AUTOMATED COUNT: 14.5 % (ref 11.9–14.6)
EST. AVERAGE GLUCOSE BLD GHB EST-MCNC: 129 MG/DL
GLOBULIN SER CALC-MCNC: 3.6 G/DL (ref 2.3–3.5)
GLUCOSE SERPL-MCNC: 86 MG/DL (ref 70–99)
HBA1C MFR BLD: 6.1 % (ref 0–5.6)
HCT VFR BLD AUTO: 39.5 % (ref 35.8–46.3)
HDLC SERPL-MCNC: 54 MG/DL (ref 40–60)
HDLC SERPL: 3.3 (ref 0–5)
HGB BLD-MCNC: 12.3 G/DL (ref 11.7–15.4)
IMM GRANULOCYTES # BLD AUTO: 0 K/UL (ref 0–0.5)
IMM GRANULOCYTES NFR BLD AUTO: 0 % (ref 0–5)
LDLC SERPL CALC-MCNC: 109 MG/DL (ref 0–100)
LYMPHOCYTES # BLD: 3.1 K/UL (ref 0.5–4.6)
LYMPHOCYTES NFR BLD: 54 % (ref 13–44)
MCH RBC QN AUTO: 27.2 PG (ref 26.1–32.9)
MCHC RBC AUTO-ENTMCNC: 31.1 G/DL (ref 31.4–35)
MCV RBC AUTO: 87.2 FL (ref 82–102)
MONOCYTES # BLD: 0.3 K/UL (ref 0.1–1.3)
MONOCYTES NFR BLD: 6 % (ref 4–12)
NEUTS SEG # BLD: 2.1 K/UL (ref 1.7–8.2)
NEUTS SEG NFR BLD: 37 % (ref 43–78)
NRBC # BLD: 0 K/UL (ref 0–0.2)
PLATELET # BLD AUTO: 287 K/UL (ref 150–450)
PMV BLD AUTO: 11.7 FL (ref 9.4–12.3)
POTASSIUM SERPL-SCNC: 4.7 MMOL/L (ref 3.5–5.1)
PROT SERPL-MCNC: 7.3 G/DL (ref 6.3–8.2)
RBC # BLD AUTO: 4.53 M/UL (ref 4.05–5.2)
SODIUM SERPL-SCNC: 139 MMOL/L (ref 136–145)
TRIGL SERPL-MCNC: 76 MG/DL (ref 0–150)
VLDLC SERPL CALC-MCNC: 15 MG/DL (ref 6–23)
WBC # BLD AUTO: 5.8 K/UL (ref 4.3–11.1)

## 2025-02-21 ENCOUNTER — NURSE TRIAGE (OUTPATIENT)
Dept: OTHER | Facility: CLINIC | Age: 60
End: 2025-02-21

## 2025-02-21 NOTE — TELEPHONE ENCOUNTER
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 C/o bruising and slight swelling to L middle finger.  Bruising located underneath fingernail-approximately 90%. No open wound or deformity. CMS intact.    Current Symptoms: Pain has improved since injury occurred.    Pain Severity: 4/10; throbbing; constant    Temperature: none     What has been tried: Ice pack, dressing for cushioning     LMP: NA Pregnant: NA    Recommended disposition:  See in office today-guided to [facility] approved location-[employer], [place]      Care advice provided, caller verbalizes understanding; denies any other questions or concerns.    Pt agreeable to being seen in office today at [employer], [place]    Reason for Disposition   MODERATE-SEVERE pain and blood present under the nail (usually > 50% of nail bed)    Protocols used: Finger Injury-ADULT-[place]

## 2025-02-27 ENCOUNTER — HOSPITAL ENCOUNTER (OUTPATIENT)
Dept: GENERAL RADIOLOGY | Age: 60
Discharge: HOME | End: 2025-03-02

## 2025-02-27 DIAGNOSIS — T14.90XA INJURY: ICD-10-CM

## 2025-02-27 PROCEDURE — 73140 X-RAY EXAM OF FINGER(S): CPT

## 2025-05-21 ENCOUNTER — OFFICE VISIT (OUTPATIENT)
Dept: NEUROSURGERY | Age: 60
End: 2025-05-21

## 2025-05-21 VITALS
DIASTOLIC BLOOD PRESSURE: 96 MMHG | OXYGEN SATURATION: 99 % | HEIGHT: 66 IN | BODY MASS INDEX: 28.61 KG/M2 | HEART RATE: 97 BPM | TEMPERATURE: 98.2 F | SYSTOLIC BLOOD PRESSURE: 147 MMHG | WEIGHT: 178 LBS

## 2025-05-21 DIAGNOSIS — M54.12 CERVICAL RADICULOPATHY: Primary | ICD-10-CM
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
## 2025-05-21 RX ORDER — CYCLOBENZAPRINE HCL 10 MG
10 TABLET ORAL 3 TIMES DAILY PRN
COMMUNITY
Start: 2025-04-02

## 2025-05-21 RX ORDER — PREDNISONE 10 MG/1
TABLET ORAL
COMMUNITY
Start: 2025-04-17
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
## 2025-05-27 ENCOUNTER — TELEPHONE (OUTPATIENT)
Dept: NEUROSURGERY | Age: 60
End: 2025-05-27

## 2025-05-27 ENCOUNTER — OFFICE VISIT (OUTPATIENT)
Dept: NEUROSURGERY | Age: 60
End: 2025-05-27
Payer: COMMERCIAL

## 2025-05-27 VITALS
DIASTOLIC BLOOD PRESSURE: 98 MMHG | TEMPERATURE: 97.3 F | BODY MASS INDEX: 28.61 KG/M2 | WEIGHT: 178 LBS | OXYGEN SATURATION: 98 % | HEIGHT: 66 IN | HEART RATE: 72 BPM | SYSTOLIC BLOOD PRESSURE: 150 MMHG

## 2025-05-27 DIAGNOSIS — M54.12 CERVICAL RADICULOPATHY: ICD-10-CM

## 2025-05-27 DIAGNOSIS — M48.02 CERVICAL SPINAL STENOSIS: Primary | ICD-10-CM
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
## 2025-05-27 PROCEDURE — 99202 OFFICE O/P NEW SF 15 MIN: CPT | Performed by: NURSE PRACTITIONER

## 2025-05-27 RX ORDER — CELECOXIB 100 MG/1
100 CAPSULE ORAL DAILY
Qty: 60 CAPSULE | Refills: 3 | Status: SHIPPED | OUTPATIENT
Start: 2025-05-27

## 2025-05-27 NOTE — TELEPHONE ENCOUNTER
Thank you for clarifying. I just wanted to be sure she wasn't charged for the visit and the code was entered.     Thank you!
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
## 2025-05-27 NOTE — TELEPHONE ENCOUNTER
Patrica,    Did you see this patient on 5/21/25?     Marianna and Donna told me this patient left without being seen, but you told her you'd refer her to PT.     I just want to be sure she doesn't get charged for an office visit if she didn't get seen.     If you didn't see her, the office visit charge for 5/21/25 should be removed and a no charge code should be entered.     Thank you,  Alana

## 2025-05-29 ENCOUNTER — OFFICE VISIT (OUTPATIENT)
Dept: ENDOCRINOLOGY | Age: 60
End: 2025-05-29
Payer: COMMERCIAL

## 2025-05-29 VITALS
HEIGHT: 66 IN | WEIGHT: 178 LBS | HEART RATE: 84 BPM | BODY MASS INDEX: 28.61 KG/M2 | OXYGEN SATURATION: 98 % | DIASTOLIC BLOOD PRESSURE: 86 MMHG | RESPIRATION RATE: 18 BRPM | SYSTOLIC BLOOD PRESSURE: 138 MMHG

## 2025-05-29 DIAGNOSIS — E04.2 MULTINODULAR GOITER: Primary | ICD-10-CM

## 2025-05-29 PROCEDURE — 99203 OFFICE O/P NEW LOW 30 MIN: CPT | Performed by: INTERNAL MEDICINE

## 2025-05-29 PROCEDURE — 76536 US EXAM OF HEAD AND NECK: CPT | Performed by: INTERNAL MEDICINE

## 2025-05-29 ASSESSMENT — ENCOUNTER SYMPTOMS
CONSTIPATION: 0
DIARRHEA: 0

## 2025-05-29 NOTE — PROGRESS NOTES
Natanael Segovia MD, [facility]  [address] [place] [address], [address]  [place] [address]  Phone [phone]  Facsimile [phone]            Monica Martínez is a 60 y.o. female seen 5/29/2025 at the request of Dr. Castillo for the evaluation of multinodular goiter        ASSESSMENT AND PLAN:    1. Multinodular goiter  She was initially diagnosed with a multinodular goiter in 2018 status post nondiagnostic FNA biopsy of the inferior left lobe nodule 3/2018 at [facility].  Thyroid ultrasound performed today revealed that the dominant nodule in the inferior left lobe was stable in size compared to the prior study in 2018.  This is obviously reassuring against malignancy.  She has multiple other small nodules bilaterally which are below the size threshold for FNA biopsy.  At this point no further imaging surveillance is necessary per ACR TI-RADS guidelines.  I will assess her thyroid function.  I will be happy to see her back in the future should the need arise.            Procedures:    Thyroid ultrasound 5/29/2025: Right lobe 3.28 x 2.14 x 5.56 cm.  There are multiple nodules scattered throughout the right lobe.  In the superior right lobe anteriorly-laterally there is a complex isoechoic nodule measuring 0.53 x 0.86 x 0.87 cm (TR 2).  In the mid right lobe posteriorly there is a cyst measuring 0.40 x 0.56 x 0.61 cm (TR 1).  In the mid right lobe there is a heterogeneous, predominantly hypoechoic nodule measuring 0.61 x 0.97 x 0.86 cm without microcalcifications (TR 4).  In the inferior right lobe there is a complex isoechoic nodule measuring 0.91 x 1.44 x 1.46 cm (TR 2).  Isthmus measures 0.79 cm.  Left lobe 1.86 x 2.07 x 5.42 cm.  In the mid to superior left lobe laterally there is a complex isoechoic nodule measuring 0.59 x 0.74 x 0.92 cm (TR 2).  Just medially is a colloid cyst measuring 0.32 x 0.48 x 0.51 cm (TR 1).  In the mid to inferior left lobe there is a
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
## 2025-06-10 NOTE — THERAPY EVALUATION
history of Anemia and Depression.  Ms. Martínez  has a past surgical history that includes Tubal ligation (1988); orthopedic surgery; Breast biopsy (Bilateral, UNKNOWN); and US BIOPSY THYROID (3/22/2018).  Social History/Living Environment:   Patient lives alone  Type of Home: House: One Story  Level of Assistance: Rehab: Independent  Assistive Device: Devices: None    Prior Level of Function/Work/Activity:   Prior Level of Function: Independent   Current Level of Function: Independent   Employment Status: Employed full time  Occupation: MA  Job Duties: standing; charting; restocking rooms; reaching/lifting      Learning:   Does the patient/guardian have any barriers to learning?: No barriers  Will there be a co-learner?: No  What is the preferred language of the patient/guardian?: English  How does the patient/guardian prefer to learn new concepts?: Listening; Reading; Demonstration; Pictures/Videos    Fall Risk Scale:   [place] Total Score: 0    Dominant Side:  right handed     OBJECTIVE   Objective:  Trigger points: positive in R UT, R levator scap, R rhomboids  Flexibility: Mod tightness in cervical paraspinals; R UT; R Levator Scap  Posture/Deformity: flat c-spine  Cervical AROM: % of normal motion in standing  Cervical spine Date:  6.12.25 Date:   Date:     Direction  Parameters Parameters Parameters   Flexion  50% pain     Extension  10% pain     Rotation  R: 20% pain  L: 15% pain     Side bending  R: 10% pain  L:5% pain     Shoulder Flex R:120deg  L:150deg     Shoulder ABD R:95deg  L:150deg     Shoulder ER/IR R:35deg/PSIS  L:65deg/T12         Strength Date:  6.12.25 Date:   Date:     Cervical/Shoulder Parameters Parameters Parameters   Cervical Grossly 3/5     Shoulder Flex/Scapt R:3-/5  L:3+/5     Shoulder ABD R:3-/5  L:3/5     Shoulder ER R:3-/5  L:3/5     Shoulder IR R:3-/5  L:3/5     Elbow Flex R:3+/5  L:4+/5     Elbow Ext R:3/5  L:4/5     : R: 44#  L: 81#          Sensation: Normal and equal B

## 2025-06-12 ENCOUNTER — HOSPITAL ENCOUNTER (OUTPATIENT)
Dept: PHYSICAL THERAPY | Age: 60
Setting detail: RECURRING SERIES
Discharge: HOME | End: 2025-06-15
Payer: COMMERCIAL

## 2025-06-12 DIAGNOSIS — M25.511 CHRONIC RIGHT SHOULDER PAIN: ICD-10-CM

## 2025-06-12 DIAGNOSIS — M62.81 MUSCLE WEAKNESS (GENERALIZED): ICD-10-CM

## 2025-06-12 DIAGNOSIS — M54.2 CERVICALGIA: Primary | ICD-10-CM

## 2025-06-12 DIAGNOSIS — G89.29 CHRONIC RIGHT SHOULDER PAIN: ICD-10-CM

## 2025-06-12 DIAGNOSIS — M54.12 RADICULOPATHY, CERVICAL REGION: ICD-10-CM

## 2025-06-12 PROCEDURE — 97161 PT EVAL LOW COMPLEX 20 MIN: CPT | Performed by: PHYSICAL THERAPIST

## 2025-06-12 PROCEDURE — 97110 THERAPEUTIC EXERCISES: CPT | Performed by: PHYSICAL THERAPIST

## 2025-06-12 PROCEDURE — 97140 MANUAL THERAPY 1/> REGIONS: CPT | Performed by: PHYSICAL THERAPIST

## 2025-06-12 ASSESSMENT — PAIN DESCRIPTION - LOCATION: LOCATION: ARM;SHOULDER;NECK

## 2025-06-12 ASSESSMENT — PAIN DESCRIPTION - ORIENTATION: ORIENTATION: RIGHT

## 2025-06-12 ASSESSMENT — PAIN DESCRIPTION - PAIN TYPE: TYPE: CHRONIC PAIN

## 2025-06-12 ASSESSMENT — PAIN SCALES - GENERAL: PAINLEVEL_OUTOF10: 6

## 2025-06-16 ENCOUNTER — HOSPITAL ENCOUNTER (OUTPATIENT)
Dept: PHYSICAL THERAPY | Age: 60
Setting detail: RECURRING SERIES
Discharge: HOME | End: 2025-06-19
Payer: COMMERCIAL

## 2025-06-16 PROCEDURE — 97110 THERAPEUTIC EXERCISES: CPT

## 2025-06-16 PROCEDURE — 97140 MANUAL THERAPY 1/> REGIONS: CPT

## 2025-06-16 ASSESSMENT — PAIN SCALES - GENERAL: PAINLEVEL_OUTOF10: 5

## 2025-06-18 ENCOUNTER — HOSPITAL ENCOUNTER (OUTPATIENT)
Dept: PHYSICAL THERAPY | Age: 60
Setting detail: RECURRING SERIES
Discharge: HOME | End: 2025-06-21
Payer: COMMERCIAL

## 2025-06-18 PROCEDURE — 97110 THERAPEUTIC EXERCISES: CPT

## 2025-06-18 PROCEDURE — 97140 MANUAL THERAPY 1/> REGIONS: CPT

## 2025-06-18 ASSESSMENT — PAIN SCALES - GENERAL: PAINLEVEL_OUTOF10: 6
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
## 2025-06-24 ENCOUNTER — HOSPITAL ENCOUNTER (OUTPATIENT)
Dept: PHYSICAL THERAPY | Age: 60
Setting detail: RECURRING SERIES
Discharge: HOME | End: 2025-06-27
Payer: COMMERCIAL

## 2025-06-24 PROCEDURE — 97140 MANUAL THERAPY 1/> REGIONS: CPT

## 2025-06-24 PROCEDURE — 97110 THERAPEUTIC EXERCISES: CPT
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
## 2025-06-25 ASSESSMENT — PAIN SCALES - GENERAL: PAINLEVEL_OUTOF10: 4

## 2025-07-03 ENCOUNTER — HOSPITAL ENCOUNTER (OUTPATIENT)
Dept: PHYSICAL THERAPY | Age: 60
Setting detail: RECURRING SERIES
Discharge: HOME | End: 2025-07-06
Payer: COMMERCIAL

## 2025-07-03 PROCEDURE — 97140 MANUAL THERAPY 1/> REGIONS: CPT

## 2025-07-03 PROCEDURE — 97110 THERAPEUTIC EXERCISES: CPT

## 2025-07-03 ASSESSMENT — PAIN SCALES - GENERAL: PAINLEVEL_OUTOF10: 5
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
## 2025-07-08 ENCOUNTER — OFFICE VISIT (OUTPATIENT)
Dept: NEUROSURGERY | Age: 60
End: 2025-07-08

## 2025-07-08 VITALS
HEIGHT: 66 IN | TEMPERATURE: 98 F | SYSTOLIC BLOOD PRESSURE: 135 MMHG | OXYGEN SATURATION: 100 % | DIASTOLIC BLOOD PRESSURE: 84 MMHG | HEART RATE: 98 BPM | BODY MASS INDEX: 28.61 KG/M2 | WEIGHT: 178 LBS
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
## 2025-07-08 DIAGNOSIS — M48.02 CERVICAL SPINAL STENOSIS: Primary | ICD-10-CM

## 2025-07-08 DIAGNOSIS — M54.12 CERVICAL RADICULOPATHY: ICD-10-CM

## 2025-07-08 PROCEDURE — 99213 OFFICE O/P EST LOW 20 MIN: CPT

## 2025-07-08 RX ORDER — GABAPENTIN 100 MG/1
300 CAPSULE ORAL 2 TIMES DAILY
Qty: 540 CAPSULE | Refills: 0 | Status: SHIPPED | OUTPATIENT
Start: 2025-07-08 | End: 2025-10-06

## 2025-07-08 RX ORDER — METHOCARBAMOL 500 MG/1
500 TABLET, FILM COATED ORAL EVERY 8 HOURS PRN
Qty: 30 TABLET | Refills: 0 | Status: SHIPPED | OUTPATIENT
Start: 2025-07-08 | End: 2025-07-18
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
## 2025-07-10 ENCOUNTER — HOSPITAL ENCOUNTER (OUTPATIENT)
Dept: PHYSICAL THERAPY | Age: 60
Setting detail: RECURRING SERIES
Discharge: HOME | End: 2025-07-13
Payer: COMMERCIAL

## 2025-07-10 PROCEDURE — 97110 THERAPEUTIC EXERCISES: CPT | Performed by: PHYSICAL THERAPIST

## 2025-07-10 PROCEDURE — 97140 MANUAL THERAPY 1/> REGIONS: CPT | Performed by: PHYSICAL THERAPIST

## 2025-07-10 ASSESSMENT — PAIN DESCRIPTION - LOCATION: LOCATION: ARM;SHOULDER;NECK

## 2025-07-10 ASSESSMENT — PAIN DESCRIPTION - PAIN TYPE: TYPE: CHRONIC PAIN

## 2025-07-10 ASSESSMENT — PAIN DESCRIPTION - ORIENTATION: ORIENTATION: RIGHT

## 2025-07-10 ASSESSMENT — PAIN SCALES - GENERAL: PAINLEVEL_OUTOF10: 5
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
## 2025-07-14 LAB
CHOLEST SERPL-MCNC: 185 MG/DL (ref 0–200)
GLUCOSE SERPL-MCNC: 99 MG/DL (ref 70–99)
HDLC SERPL-MCNC: 49 MG/DL (ref 40–60)
LDLC SERPL CALC-MCNC: 114 MG/DL (ref 0–100)
TRIGL SERPL-MCNC: 112 MG/DL (ref 0–150)

## 2025-07-15 ENCOUNTER — HOSPITAL ENCOUNTER (OUTPATIENT)
Dept: PHYSICAL THERAPY | Age: 60
Setting detail: RECURRING SERIES
Discharge: HOME | End: 2025-07-18
Payer: COMMERCIAL

## 2025-07-15 PROCEDURE — 97110 THERAPEUTIC EXERCISES: CPT | Performed by: PHYSICAL THERAPIST

## 2025-07-15 PROCEDURE — 97140 MANUAL THERAPY 1/> REGIONS: CPT | Performed by: PHYSICAL THERAPIST

## 2025-07-15 ASSESSMENT — PAIN DESCRIPTION - ORIENTATION
ORIENTATION: RIGHT
ORIENTATION: RIGHT

## 2025-07-15 ASSESSMENT — PAIN DESCRIPTION - PAIN TYPE
TYPE: CHRONIC PAIN
TYPE: CHRONIC PAIN

## 2025-07-15 ASSESSMENT — PAIN SCALES - GENERAL: PAINLEVEL_OUTOF10: 4

## 2025-07-15 ASSESSMENT — PAIN DESCRIPTION - DESCRIPTORS
DESCRIPTORS: ACHING;SORE;TIGHTNESS
DESCRIPTORS: ACHING;SORE;TIGHTNESS

## 2025-07-15 ASSESSMENT — PAIN DESCRIPTION - LOCATION
LOCATION: SHOULDER;NECK
LOCATION: SHOULDER;NECK

## 2025-07-17 ENCOUNTER — HOSPITAL ENCOUNTER (OUTPATIENT)
Dept: PHYSICAL THERAPY | Age: 60
Setting detail: RECURRING SERIES
Discharge: HOME | End: 2025-07-20
Payer: COMMERCIAL

## 2025-07-17 PROCEDURE — 97110 THERAPEUTIC EXERCISES: CPT

## 2025-07-17 PROCEDURE — 97140 MANUAL THERAPY 1/> REGIONS: CPT

## 2025-07-17 ASSESSMENT — PAIN SCALES - GENERAL: PAINLEVEL_OUTOF10: 5
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
## 2025-07-17 NOTE — PROGRESS NOTES
TB; 15x L2 TB; 20x Unilat, 8 x 2 iso 3s B    B shoulder ER L2 TB; 15x L2 TB; 20x    Ys   Plantigrade on ball  10 x 2 B, 1#   Walking shrugs      Bench press      Swiss ball wall walks   X15 iso 3s   TB ER walkouts      Forward raise      Lateral raise      Shrugs      DB W's on wall      Access Code: [email or web address]  URL: [email or web address]  Date: 07/15/2025  Prepared by: Merle Lavell    Exercises  - Shoulder extension with resistance - Neutral  - 1-2 x daily - 7 x weekly - 1-2 sets - 10 reps - 3 hold  - Shoulder Extension with Resistance - Palms Forward  - 1-2 x daily - 7 x weekly - 1-2 sets - 10 reps  - Standing Shoulder Horizontal Abduction with Anchored Resistance  - 1-2 x daily - 7 x weekly - 1-2 sets - 10 reps - 3 hold  - Standing Row with Resistance with Anchored Resistance at Chest Height Palms Down  - 1-2 x daily - 7 x weekly - 1-2 sets - 10 reps - 3 hold  - Scapular Retraction with Resistance  - 1-2 x daily - 7 x weekly - 1-2 sets - 10 reps - 3 hold  - Shoulder External Rotation and Scapular Retraction with Resistance  - 1-2 x daily - 7 x weekly - 1-2 sets - 10 reps - 3 hold  Access Code: [email or web address]  URL: [email or web address]  Date: 06/12/2025  Prepared by: Merle Lavell    Exercises  - Standing Cervical Flexion AROM  - 2 x daily - 7 x weekly - 2 sets - 10 reps  - Standing Cervical Rotation AROM  - 2 x daily - 7 x weekly - 2 sets - 10 reps  - Seated Cervical Retraction  - 2 x daily - 7 x weekly - 2 sets - 10 reps  - Supine Cervical Retraction with Towel  - 2 x daily - 7 x weekly - 2 sets - 10 reps  - Standing Backward Shoulder Rolls  - 2 x daily - 7 x weekly - 2 sets - 10 reps  - Standing Scapular Retraction  - 2 x daily - 7 x weekly - 2 sets - 10 reps    FUNCTIONAL DRY NEEDLING: (5 minutes untimed):      With written consent received and precautions reviewed, instrument-assisted soft tissue mobilization was performed to:  Muscle(s): bilateral upper trapezius and cervical

## 2025-07-22 ENCOUNTER — HOSPITAL ENCOUNTER (OUTPATIENT)
Dept: PHYSICAL THERAPY | Age: 60
Setting detail: RECURRING SERIES
Discharge: HOME | End: 2025-07-25
Payer: COMMERCIAL

## 2025-07-22 PROCEDURE — 97110 THERAPEUTIC EXERCISES: CPT | Performed by: PHYSICAL THERAPIST

## 2025-07-22 PROCEDURE — 97140 MANUAL THERAPY 1/> REGIONS: CPT | Performed by: PHYSICAL THERAPIST

## 2025-07-22 ASSESSMENT — PAIN DESCRIPTION - PAIN TYPE: TYPE: CHRONIC PAIN

## 2025-07-22 ASSESSMENT — PAIN SCALES - GENERAL: PAINLEVEL_OUTOF10: 7

## 2025-07-22 ASSESSMENT — PAIN DESCRIPTION - LOCATION: LOCATION: SHOULDER;NECK

## 2025-07-22 ASSESSMENT — PAIN DESCRIPTION - DESCRIPTORS: DESCRIPTORS: ACHING;SORE;TIGHTNESS

## 2025-07-22 ASSESSMENT — PAIN DESCRIPTION - ORIENTATION: ORIENTATION: RIGHT
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
## 2025-07-24 ENCOUNTER — HOSPITAL ENCOUNTER (OUTPATIENT)
Dept: PHYSICAL THERAPY | Age: 60
Setting detail: RECURRING SERIES
Discharge: HOME | End: 2025-07-27
Payer: COMMERCIAL

## 2025-07-24 PROCEDURE — 97110 THERAPEUTIC EXERCISES: CPT | Performed by: PHYSICAL THERAPIST

## 2025-07-24 PROCEDURE — 97140 MANUAL THERAPY 1/> REGIONS: CPT | Performed by: PHYSICAL THERAPIST

## 2025-07-24 ASSESSMENT — PAIN DESCRIPTION - LOCATION: LOCATION: NECK;SHOULDER

## 2025-07-24 ASSESSMENT — PAIN DESCRIPTION - PAIN TYPE: TYPE: CHRONIC PAIN

## 2025-07-24 ASSESSMENT — PAIN SCALES - GENERAL: PAINLEVEL_OUTOF10: 5

## 2025-07-24 ASSESSMENT — PAIN DESCRIPTION - ORIENTATION: ORIENTATION: RIGHT
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
## 2025-07-29 ENCOUNTER — HOSPITAL ENCOUNTER (OUTPATIENT)
Dept: PHYSICAL THERAPY | Age: 60
Setting detail: RECURRING SERIES
Discharge: HOME | End: 2025-08-01
Payer: COMMERCIAL

## 2025-07-29 PROCEDURE — 97110 THERAPEUTIC EXERCISES: CPT | Performed by: PHYSICAL THERAPIST

## 2025-07-29 PROCEDURE — 97140 MANUAL THERAPY 1/> REGIONS: CPT | Performed by: PHYSICAL THERAPIST

## 2025-07-29 ASSESSMENT — PAIN DESCRIPTION - PAIN TYPE: TYPE: CHRONIC PAIN

## 2025-07-29 ASSESSMENT — PAIN DESCRIPTION - LOCATION: LOCATION: NECK;ARM

## 2025-07-29 ASSESSMENT — PAIN SCALES - GENERAL: PAINLEVEL_OUTOF10: 4

## 2025-07-29 ASSESSMENT — PAIN DESCRIPTION - DESCRIPTORS: DESCRIPTORS: ACHING;SORE;TIGHTNESS

## 2025-07-29 ASSESSMENT — PAIN DESCRIPTION - ORIENTATION: ORIENTATION: RIGHT
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
## 2025-07-31 ENCOUNTER — HOSPITAL ENCOUNTER (OUTPATIENT)
Dept: PHYSICAL THERAPY | Age: 60
Setting detail: RECURRING SERIES
End: 2025-07-31
Payer: COMMERCIAL

## 2025-07-31 PROCEDURE — 97110 THERAPEUTIC EXERCISES: CPT | Performed by: PHYSICAL THERAPIST

## 2025-07-31 PROCEDURE — 97140 MANUAL THERAPY 1/> REGIONS: CPT | Performed by: PHYSICAL THERAPIST

## 2025-07-31 ASSESSMENT — PAIN SCALES - GENERAL: PAINLEVEL_OUTOF10: 3

## 2025-07-31 ASSESSMENT — PAIN DESCRIPTION - PAIN TYPE: TYPE: CHRONIC PAIN

## 2025-07-31 ASSESSMENT — PAIN DESCRIPTION - DESCRIPTORS: DESCRIPTORS: ACHING;SORE;TIGHTNESS

## 2025-07-31 ASSESSMENT — PAIN DESCRIPTION - LOCATION: LOCATION: NECK;ARM

## 2025-07-31 ASSESSMENT — PAIN DESCRIPTION - ORIENTATION: ORIENTATION: RIGHT

## 2025-08-12 ENCOUNTER — HOSPITAL ENCOUNTER (OUTPATIENT)
Dept: PHYSICAL THERAPY | Age: 60
Setting detail: RECURRING SERIES
Discharge: HOME | End: 2025-08-15
Payer: COMMERCIAL

## 2025-08-12 PROCEDURE — 97110 THERAPEUTIC EXERCISES: CPT | Performed by: PHYSICAL THERAPIST

## 2025-08-12 PROCEDURE — 97140 MANUAL THERAPY 1/> REGIONS: CPT | Performed by: PHYSICAL THERAPIST

## 2025-08-12 ASSESSMENT — PAIN DESCRIPTION - DESCRIPTORS: DESCRIPTORS: TIGHTNESS;SORE

## 2025-08-12 ASSESSMENT — PAIN SCALES - GENERAL: PAINLEVEL_OUTOF10: 4

## 2025-08-12 ASSESSMENT — PAIN DESCRIPTION - PAIN TYPE: TYPE: CHRONIC PAIN

## 2025-08-12 ASSESSMENT — PAIN DESCRIPTION - LOCATION: LOCATION: NECK

## 2025-08-14 ENCOUNTER — HOSPITAL ENCOUNTER (OUTPATIENT)
Dept: PHYSICAL THERAPY | Age: 60
Setting detail: RECURRING SERIES
Discharge: HOME | End: 2025-08-17
Payer: COMMERCIAL

## 2025-08-14 PROCEDURE — 97140 MANUAL THERAPY 1/> REGIONS: CPT | Performed by: PHYSICAL THERAPIST

## 2025-08-14 PROCEDURE — 97110 THERAPEUTIC EXERCISES: CPT | Performed by: PHYSICAL THERAPIST

## 2025-08-14 ASSESSMENT — PAIN DESCRIPTION - LOCATION: LOCATION: NECK

## 2025-08-14 ASSESSMENT — PAIN DESCRIPTION - PAIN TYPE: TYPE: CHRONIC PAIN

## 2025-08-14 ASSESSMENT — PAIN SCALES - GENERAL: PAINLEVEL_OUTOF10: 4

## 2025-08-14 ASSESSMENT — PAIN DESCRIPTION - ORIENTATION: ORIENTATION: RIGHT

## 2025-08-14 ASSESSMENT — PAIN DESCRIPTION - DESCRIPTORS: DESCRIPTORS: TIGHTNESS;SORE

## 2025-08-19 ENCOUNTER — HOSPITAL ENCOUNTER (OUTPATIENT)
Dept: PHYSICAL THERAPY | Age: 60
Setting detail: RECURRING SERIES
Discharge: HOME | End: 2025-08-22
Payer: COMMERCIAL

## 2025-08-19 PROCEDURE — 97110 THERAPEUTIC EXERCISES: CPT | Performed by: PHYSICAL THERAPIST

## 2025-08-19 PROCEDURE — 97140 MANUAL THERAPY 1/> REGIONS: CPT | Performed by: PHYSICAL THERAPIST

## 2025-08-19 ASSESSMENT — PAIN DESCRIPTION - PAIN TYPE: TYPE: CHRONIC PAIN

## 2025-08-19 ASSESSMENT — PAIN DESCRIPTION - ORIENTATION: ORIENTATION: RIGHT

## 2025-08-19 ASSESSMENT — PAIN SCALES - GENERAL: PAINLEVEL_OUTOF10: 6

## 2025-08-19 ASSESSMENT — PAIN DESCRIPTION - DESCRIPTORS: DESCRIPTORS: TIGHTNESS

## 2025-08-19 ASSESSMENT — PAIN DESCRIPTION - LOCATION: LOCATION: NECK

## 2025-08-21 ENCOUNTER — HOSPITAL ENCOUNTER (OUTPATIENT)
Dept: PHYSICAL THERAPY | Age: 60
Setting detail: RECURRING SERIES
End: 2025-08-21
Payer: COMMERCIAL

## 2025-08-22 ENCOUNTER — CLINICAL DOCUMENTATION (OUTPATIENT)
Dept: PHARMACY | Facility: CLINIC | Age: 60
End: 2025-08-22

## 2025-08-26 ENCOUNTER — HOSPITAL ENCOUNTER (OUTPATIENT)
Dept: PHYSICAL THERAPY | Age: 60
Setting detail: RECURRING SERIES
Discharge: HOME | End: 2025-08-29
Payer: COMMERCIAL

## 2025-08-26 PROCEDURE — 97140 MANUAL THERAPY 1/> REGIONS: CPT | Performed by: PHYSICAL THERAPIST

## 2025-08-26 PROCEDURE — 97110 THERAPEUTIC EXERCISES: CPT | Performed by: PHYSICAL THERAPIST

## 2025-08-26 ASSESSMENT — PAIN DESCRIPTION - PAIN TYPE: TYPE: CHRONIC PAIN

## 2025-08-26 ASSESSMENT — PAIN SCALES - GENERAL: PAINLEVEL_OUTOF10: 3

## 2025-08-26 ASSESSMENT — PAIN DESCRIPTION - LOCATION: LOCATION: NECK

## 2025-08-26 ASSESSMENT — PAIN DESCRIPTION - ORIENTATION: ORIENTATION: RIGHT;LEFT

## 2025-08-26 ASSESSMENT — PAIN DESCRIPTION - DESCRIPTORS: DESCRIPTORS: ACHING;SORE;TIGHTNESS;TENDER

## 2025-08-28 ENCOUNTER — HOSPITAL ENCOUNTER (OUTPATIENT)
Dept: PHYSICAL THERAPY | Age: 60
Setting detail: RECURRING SERIES
Discharge: HOME | End: 2025-08-31
Payer: COMMERCIAL

## 2025-08-28 PROCEDURE — 97140 MANUAL THERAPY 1/> REGIONS: CPT | Performed by: PHYSICAL THERAPIST

## 2025-08-28 PROCEDURE — 97110 THERAPEUTIC EXERCISES: CPT | Performed by: PHYSICAL THERAPIST

## 2025-08-28 ASSESSMENT — PAIN DESCRIPTION - DESCRIPTORS: DESCRIPTORS: ACHING;SORE;TIGHTNESS

## 2025-08-28 ASSESSMENT — PAIN DESCRIPTION - PAIN TYPE: TYPE: CHRONIC PAIN

## 2025-08-28 ASSESSMENT — PAIN DESCRIPTION - ORIENTATION: ORIENTATION: RIGHT;LEFT

## 2025-08-28 ASSESSMENT — PAIN SCALES - GENERAL: PAINLEVEL_OUTOF10: 3

## 2025-08-28 ASSESSMENT — PAIN DESCRIPTION - LOCATION: LOCATION: NECK

## 2025-09-02 ENCOUNTER — HOSPITAL ENCOUNTER (OUTPATIENT)
Dept: PHYSICAL THERAPY | Age: 60
Setting detail: RECURRING SERIES
Discharge: HOME | End: 2025-09-05
Payer: COMMERCIAL

## 2025-09-02 PROCEDURE — 97110 THERAPEUTIC EXERCISES: CPT | Performed by: PHYSICAL THERAPIST

## 2025-09-02 PROCEDURE — 97140 MANUAL THERAPY 1/> REGIONS: CPT | Performed by: PHYSICAL THERAPIST

## 2025-09-02 ASSESSMENT — PAIN DESCRIPTION - ORIENTATION: ORIENTATION: RIGHT

## 2025-09-02 ASSESSMENT — PAIN DESCRIPTION - DESCRIPTORS: DESCRIPTORS: SORE;TIGHTNESS;ACHING

## 2025-09-02 ASSESSMENT — PAIN DESCRIPTION - PAIN TYPE: TYPE: CHRONIC PAIN

## 2025-09-02 ASSESSMENT — PAIN SCALES - GENERAL: PAINLEVEL_OUTOF10: 4

## 2025-09-02 ASSESSMENT — PAIN DESCRIPTION - LOCATION: LOCATION: NECK
